# Patient Record
Sex: FEMALE | Race: WHITE | NOT HISPANIC OR LATINO | Employment: FULL TIME | URBAN - METROPOLITAN AREA
[De-identification: names, ages, dates, MRNs, and addresses within clinical notes are randomized per-mention and may not be internally consistent; named-entity substitution may affect disease eponyms.]

---

## 2017-02-08 ENCOUNTER — LAB REQUISITION (OUTPATIENT)
Dept: LAB | Facility: HOSPITAL | Age: 47
End: 2017-02-08
Payer: COMMERCIAL

## 2017-02-08 ENCOUNTER — ALLSCRIPTS OFFICE VISIT (OUTPATIENT)
Dept: OTHER | Facility: OTHER | Age: 47
End: 2017-02-08

## 2017-02-08 DIAGNOSIS — Z01.419 ENCOUNTER FOR GYNECOLOGICAL EXAMINATION WITHOUT ABNORMAL FINDING: ICD-10-CM

## 2017-02-08 DIAGNOSIS — R92.2 INCONCLUSIVE MAMMOGRAM: ICD-10-CM

## 2017-02-08 DIAGNOSIS — N63.0 BREAST LUMP: ICD-10-CM

## 2017-02-08 DIAGNOSIS — Z12.31 ENCOUNTER FOR SCREENING MAMMOGRAM FOR MALIGNANT NEOPLASM OF BREAST: ICD-10-CM

## 2017-02-08 DIAGNOSIS — N93.9 ABNORMAL UTERINE AND VAGINAL BLEEDING, UNSPECIFIED: ICD-10-CM

## 2017-02-08 PROCEDURE — G0145 SCR C/V CYTO,THINLAYER,RESCR: HCPCS | Performed by: OBSTETRICS & GYNECOLOGY

## 2017-02-08 PROCEDURE — 87624 HPV HI-RISK TYP POOLED RSLT: CPT | Performed by: OBSTETRICS & GYNECOLOGY

## 2017-02-10 ENCOUNTER — HOSPITAL ENCOUNTER (OUTPATIENT)
Dept: MAMMOGRAPHY | Facility: MEDICAL CENTER | Age: 47
Discharge: HOME | End: 2017-02-10
Payer: COMMERCIAL

## 2017-02-10 ENCOUNTER — APPOINTMENT (OUTPATIENT)
Dept: LAB | Facility: MEDICAL CENTER | Age: 47
End: 2017-02-10
Payer: COMMERCIAL

## 2017-02-10 ENCOUNTER — HOSPITAL ENCOUNTER (OUTPATIENT)
Dept: ULTRASOUND IMAGING | Facility: CLINIC | Age: 47
Discharge: HOME | End: 2017-02-10
Payer: COMMERCIAL

## 2017-02-10 ENCOUNTER — TRANSCRIBE ORDERS (OUTPATIENT)
Dept: ADMINISTRATIVE | Facility: HOSPITAL | Age: 47
End: 2017-02-10

## 2017-02-10 DIAGNOSIS — R92.2 INCONCLUSIVE MAMMOGRAM: ICD-10-CM

## 2017-02-10 DIAGNOSIS — N63.0 LUMP OR MASS IN BREAST: ICD-10-CM

## 2017-02-10 DIAGNOSIS — Z12.31 ENCOUNTER FOR SCREENING MAMMOGRAM FOR MALIGNANT NEOPLASM OF BREAST: ICD-10-CM

## 2017-02-10 DIAGNOSIS — N63.0 BREAST LUMP: ICD-10-CM

## 2017-02-10 DIAGNOSIS — N93.9 ABNORMAL UTERINE AND VAGINAL BLEEDING, UNSPECIFIED: ICD-10-CM

## 2017-02-10 LAB — TSH SERPL DL<=0.05 MIU/L-ACNC: 1.91 UIU/ML (ref 0.36–3.74)

## 2017-02-10 PROCEDURE — 36415 COLL VENOUS BLD VENIPUNCTURE: CPT

## 2017-02-10 PROCEDURE — G0279 TOMOSYNTHESIS, MAMMO: HCPCS

## 2017-02-10 PROCEDURE — 84443 ASSAY THYROID STIM HORMONE: CPT

## 2017-02-10 PROCEDURE — 76642 ULTRASOUND BREAST LIMITED: CPT

## 2017-02-10 PROCEDURE — G0204 DX MAMMO INCL CAD BI: HCPCS

## 2017-02-13 ENCOUNTER — HOSPITAL ENCOUNTER (OUTPATIENT)
Dept: ULTRASOUND IMAGING | Facility: HOSPITAL | Age: 47
Discharge: HOME | End: 2017-02-13
Attending: OBSTETRICS & GYNECOLOGY
Payer: COMMERCIAL

## 2017-02-13 DIAGNOSIS — N93.9 ABNORMAL UTERINE AND VAGINAL BLEEDING, UNSPECIFIED: ICD-10-CM

## 2017-02-13 PROCEDURE — 76830 TRANSVAGINAL US NON-OB: CPT

## 2017-02-13 PROCEDURE — 76856 US EXAM PELVIC COMPLETE: CPT

## 2017-02-14 LAB — HPV RRNA GENITAL QL NAA+PROBE: NORMAL

## 2017-02-15 ENCOUNTER — GENERIC CONVERSION - ENCOUNTER (OUTPATIENT)
Dept: OTHER | Facility: OTHER | Age: 47
End: 2017-02-15

## 2017-02-15 LAB
LAB AP GYN PRIMARY INTERPRETATION: NORMAL
Lab: NORMAL

## 2017-07-12 DIAGNOSIS — R92.8 OTHER ABNORMAL AND INCONCLUSIVE FINDINGS ON DIAGNOSTIC IMAGING OF BREAST: ICD-10-CM

## 2017-07-24 ENCOUNTER — ALLSCRIPTS OFFICE VISIT (OUTPATIENT)
Dept: OTHER | Facility: OTHER | Age: 47
End: 2017-07-24

## 2017-08-11 ENCOUNTER — HOSPITAL ENCOUNTER (OUTPATIENT)
Dept: ULTRASOUND IMAGING | Facility: CLINIC | Age: 47
Discharge: HOME | End: 2017-08-11
Payer: COMMERCIAL

## 2017-08-11 DIAGNOSIS — R92.8 OTHER ABNORMAL AND INCONCLUSIVE FINDINGS ON DIAGNOSTIC IMAGING OF BREAST: ICD-10-CM

## 2017-08-11 PROCEDURE — 76642 ULTRASOUND BREAST LIMITED: CPT

## 2017-08-15 DIAGNOSIS — R92.8 OTHER ABNORMAL AND INCONCLUSIVE FINDINGS ON DIAGNOSTIC IMAGING OF BREAST: ICD-10-CM

## 2017-08-21 ENCOUNTER — GENERIC CONVERSION - ENCOUNTER (OUTPATIENT)
Dept: OTHER | Facility: OTHER | Age: 47
End: 2017-08-21

## 2017-09-15 ENCOUNTER — GENERIC CONVERSION - ENCOUNTER (OUTPATIENT)
Dept: OTHER | Facility: OTHER | Age: 47
End: 2017-09-15

## 2018-01-11 NOTE — RESULT NOTES
Verified Results  * US PELVIS COMPLETE [facility] [place] AND TRANSVAGINAL) [id number] 02:25CN Reji Danielle Order Number: [id number]    - Patient Instructions: To schedule this appointment, please contact Central Scheduling at [phone]  Test Name Result Flag Reference   US PELVIS COMPLETE (TRANSABDOMINAL AND TRANSVAGINAL) (Report)     PELVIC ULTRASOUND, COMPLETE     INDICATION: 14-year-old premenopausal woman presenting with irregular periods  History of uterine fibroids  LMP was 2017      COMPARISON: None available at time of image interpretation  TECHNIQUE:  Transabdominal pelvic ultrasound was performed in sagittal and transverse planes with a curvilinear transducer  Additional transvaginal imaging was performed to better evaluate the endometrium and ovaries  Imaging included volumetric    sweeps as well as traditional still imaging technique  FINDINGS:     UTERUS:   The uterus is anteverted in position, measuring 10 4 x 4 3 x 6 5 cm  Contour and echotexture appear normal     Multiple uterine leiomyomas are noted includin  Right lower uterine segment subserosal fibroid measuring 4 9 x 3 9 x 4 6 cm    2  Left fundal subserosal fibroid measuring 3 8 x 3 4 x 3 9 cm   The cervix shows no suspicious abnormality  ENDOMETRIUM:    Normal caliber of 15 mm  Homogenous and normal in appearance for a premenopausal female  OVARIES/ADNEXA:   Right ovary: 2 7 x 1 5 x 1 4 cm  No suspicious right ovarian abnormality  Doppler flow within normal limits  Left ovary: Suboptimal evaluated as it is only visualized transabdominally  2 9 x 1 9 x 2 4 cm  No suspicious left ovarian abnormality  Doppler flow within normal limits  No suspicious adnexal mass or loculated collections  There is no free fluid  IMPRESSION:     1  Uterine fibroids as above  2  Suboptimal visualization of the left ovary, as it is only seen transabdominally            Workstation performed: [id number]     Signed by:   Elizabeth Glez MD   2/14/17     *US BREAST RIGHT LIMITED (DIAGNOSTIC) [id number] 61:72OP Camillia Begun     Test Name Result Flag Reference   US BREAST RIGHT LIMITED (Report)     Patient History:   Family history of breast cancer at age 48 in mother  No Hormone Replacement Therapy   Patient has never smoked  Patient's BMI is 2 4  Reason for exam: high-risk patient  Mammo Diagnostic Bilateral W DBT and CAD: February 10, 2017 -    Check In #: [de-identified]   2D/3D Procedure   3D views: Bilateral MLO view(s) were taken  2D views: Bilateral CC and XCCL view(s) were taken  Radiologist: GATO Oropeza  at [address] [place] [address]   Technologist: ABDIEL Ramirez (ABDIEL)(M)   Prior study comparison: June 6, 2016, bilateral screening whole    breast ultrasound, performed at [address]  May 25, 2016, mammo diagnostic left W CAD, performed at    [address]  May 25, 2016, US breast left    limited, performed at [address]  January 20, 2016, mammo screening bilateral W CAD, performed at [place] [address]  March 6, 2012, bilateral    screening mammogram, performed at [facility]  February 17, 2011, bilateral screening mammogram, performed at    [facility]  December 29, 2009, bilateral mammogram,    performed at [facility]  The breast tissue is heterogeneously dense, potentially limiting    the sensitivity of mammography  Patient risk, included in this    report, assists in determining the appropriate screening regimen    (such as 3-D mammography or the inclusion of automated breast    ultrasound or MRI)  3-D mammography may also remain indicated as    screening  US Breast Right Limited: February 10, 2017 - Check In #: [de-identified]   Standard views       Radiologist: GATO Oropeza  at this facility Technologist: Vazquez Flattery   Heterogeneity can be either focal or diffuse  The breast    echotexture is characterized by multiple small areas of increased   and decreased echogenicity  Shadowing may occur at the    interfaces of the fat lobules and parenchyma  This pattern occurs   in younger breasts and those with heterogeneously dense    parenchyma depicted mammographically  Prior to imaging, a    radiopaque marker was applied to the skin of the upper outer    periareolar right breast in the area of palpable abnormality  In the area of palpable abnormality, a 1 6 cm nodule is    identified at approximately the 10 o'clock position  No other nodules are seen, although nodules could be obscured    within the heterogenously dense breast tissue  There is no    evidence of suspicious microcalcifications  There is also no    evidence of architectural distortion or skin thickening  The    axillary regions are benign  Following diagnostic mammography, the patient was taken to the    ultrasound suite  RIGHT BREAST ULTRASOUND:     Grayscale sonography of the upper outer periareolar right breast    reveals a cluster of cysts, the largest measuring 1 2 x 0 6 x 0 9   cm  These correspond to the nodular density seen on    mammography  At the 10 o'clock position, 5 cm from the nipple, located just    superficial to the pectoralis muscle, there is a hypoechoic    nodule which measures 0 9 x 0 6 x 0 8 cm  The differential    diagnosis includes complicated cyst versus solid nodule such as    fibroadenoma  Repeat right breast ultrasound in 6 months is    recommended  A 3 mm cyst is seen at the 10 o'clock position, 6 cm the nipple  1  Upper outer right breast nodule, located in the area of    palpable abnormality, which corresponds to a cluster of cysts on    ultrasound  No further follow-up is necessary     2  There is a 9 mm hypoechoic nodule at the 10 o'clock position    of the right breast, seen on ultrasound, for which six-month    follow-up ultrasound is recommended  3  Unremarkable left mammogram      ACR BI-RADS® Assessments: BiRad:3 - Probably Benign (Overall)   Diag Mammo: BiRad:2 - benign finding in both breasts  Right breast Right Brst US: BiRad:3 - probably benign finding in    the right breast      Recommendation:   Clinical management  Ultrasound of the right breast in 6 months  Transcription Location: [facility] 98: [id number]     Risk Value(s):   Tyrer-Cuzick 10 Year: 3 700%, Tyrer-Cuzick Lifetime: 17 700%,    Myriad Table: 1 5%, SEVERIANO 5 Year: 1 5%, NCI Lifetime: 15 7%   Signed by:   Marivel Glasgow MD   2/10/17     (1) TSH WITH FT4 REFLEX [id number] 80:48ZU Liset Colvin Order Number: [id number]     Test Name Result Flag Reference   TSH 1 910 uIU/mL  0 358-3 740   Patients undergoing fluorescein dye angiography may retain small amounts of fluorescein in the body for 48-72 hours post procedure  Samples containing fluorescein can produce falsely depressed TSH values  If the patient had this procedure,a specimen should be resubmitted post fluorescein clearance  The recommended reference ranges for TSH during pregnancy are as follows:  First trimester 0 1 to 2 5 uIU/mL  Second trimester  0 2 to 3 0 uIU/mL  Third trimester 0 3 to 3 0 uIU/m     [facility] DIAGNOSTIC BILATERAL W 3D & CAD [id number] 16:63UX Liset Colvin Order Number: [id number]    - Patient Instructions: To schedule this appointment, please contact Central Scheduling at [phone]  Do not wear any perfume, powder, lotion or deodorant on breast or underarm area  Please bring your doctors order, referral (if needed) and insurance information with you on the day of the test  Failure to bring this information may result in this test being rescheduled  Arrive 15 minutes prior to your appointment time to register      On the day of your test, please bring any prior mammogram or breast studies with you that were not performed at a [facility]  Failure to bring prior exams may result in your test needing to be rescheduled  Test Name Result Flag Reference   MAMMO DIAGNOSTIC BILATERAL W 3D & CAD (Report)     Patient History:   Family history of breast cancer at age 48 in mother  No Hormone Replacement Therapy   Patient has never smoked  Patient's BMI is 2 4  Reason for exam: high-risk patient  Mammo Diagnostic Bilateral W DBT and CAD: February 10, 2017 -    Check In #: [de-identified]   2D/3D Procedure   3D views: Bilateral MLO view(s) were taken  2D views: Bilateral CC and XCCL view(s) were taken  Radiologist: GATO Hudson  at [place] [address]   Technologist: ABDIEL Chong (R)(M)   Prior study comparison: June 6, 2016, bilateral screening whole    breast ultrasound, performed at [address] [place] [address]  May 25, 2016, mammo diagnostic left W CAD, performed at    [address] [place] [address]  May 25, 2016, US breast left    limited, performed at [address] [place] [address]  January 20, 2016, mammo screening bilateral W CAD, performed at [facility]  March 6, 2012, bilateral    screening mammogram, performed at [facility]  February 17, 2011, bilateral screening mammogram, performed at    [facility]  December 29, 2009, bilateral mammogram,    performed at [facility]  The breast tissue is heterogeneously dense, potentially limiting    the sensitivity of mammography  Patient risk, included in this    report, assists in determining the appropriate screening regimen    (such as 3-D mammography or the inclusion of automated breast    ultrasound or MRI)  3-D mammography may also remain indicated as    screening  US Breast Right Limited: February 10, 2017 - Check In #: [de-identified]   Standard views       Radiologist: GATO Hudson  at [facility] facility   Technologist: Marycruz Andrews   Heterogeneity can be either focal or diffuse  The breast    echotexture is characterized by multiple small areas of increased   and decreased echogenicity  Shadowing may occur at the    interfaces of the fat lobules and parenchyma  This pattern occurs   in younger breasts and those with heterogeneously dense    parenchyma depicted mammographically  Prior to imaging, a    radiopaque marker was applied to the skin of the upper outer    periareolar right breast in the area of palpable abnormality  In the area of palpable abnormality, a 1 6 cm nodule is    identified at approximately the 10 o'clock position  No other nodules are seen, although nodules could be obscured    within the heterogenously dense breast tissue  There is no    evidence of suspicious microcalcifications  There is also no    evidence of architectural distortion or skin thickening  The    axillary regions are benign  Following diagnostic mammography, the patient was taken to the    ultrasound suite  RIGHT BREAST ULTRASOUND:     Grayscale sonography of the upper outer periareolar right breast    reveals a cluster of cysts, the largest measuring 1 2 x 0 6 x 0 9   cm  These correspond to the nodular density seen on    mammography  At the 10 o'clock position, 5 cm from the nipple, located just    superficial to the pectoralis muscle, there is a hypoechoic    nodule which measures 0 9 x 0 6 x 0 8 cm  The differential    diagnosis includes complicated cyst versus solid nodule such as    fibroadenoma  Repeat right breast ultrasound in 6 months is    recommended  A 3 mm cyst is seen at the 10 o'clock position, 6 cm the nipple  1  Upper outer right breast nodule, located in the area of    palpable abnormality, which corresponds to a cluster of cysts on    ultrasound  No further follow-up is necessary     2  There is a 9 mm hypoechoic nodule at the 10 o'clock position    of the right breast, seen on ultrasound, for which six-month    follow-up ultrasound is recommended  3  Unremarkable left mammogram      ACR BI-RADS® Assessments: BiRad:3 - Probably Benign (Overall)   Diag Mammo: BiRad:2 - benign finding in both breasts  Right breast Right Brst US: BiRad:3 - probably benign finding in    the right breast      Recommendation:   Clinical management  Ultrasound of the right breast in 6 months  Transcription Location: [facility] 98: [id number]     Risk Value(s):   Tyrer-Cuzick 10 Year: 3 700%, Tyrer-Cuzick Lifetime: 17 700%,    Myriad Table: 1 5%, SEVERIANO 5 Year: 1 5%, NCI Lifetime: 15 7%   Signed by:   Rogerio Simpson MD   2/10/17     (1) THIN PREP PAP WITH IMAGING [id number] 92:60LQ Ancel Primrose Order Number: [id number]     Test Name Result Flag Reference   LAB AP CASE REPORT (Report)     Gynecologic Cytology Report            Case: [id number]                  Authorizing Provider: Cody Morrissey DO     Collected:      02/08/2017 5562        First Screen:     VICKI Triana Received:      02/10/2017 0944        Specimen:  LIQUID-BASED PAP, SCREENING, Endocervical   HPV HIGH RISK RESULT (Report)     HPV, High Risk: HPV NEG, HPV16 NEG, HPV18 NEG      Other High Risk HPV Negative, HPV 16 Negative, HPV 18 Negative  HPV types: 16,18,31,33,35,39,45,51,52,56,58,59,66 and 68 DNA are undetectable or below the pre-set threshold  [employer]?s FDA approved Madeline 4800 is utilized with strict adherence to the ?s instruction  manual to test for the presence of High-Risk HPV DNA, as well as HPV 16 and HPV 18  This instrument  has been validated by our laboratory and/or by the   A negative result does not preclude the presence of HPV infection because results depend on adequate  specimen collection, absence of inhibitors and sufficient DNA to be detected   Additionally, HPV negative  results are not intended to prevent women from proceeding to colposcopy if clinically warranted  Positive HPV test results indicate the presence of any one or more of the high risk types, but since patients  are often co-infected with low-risk types it does not rule out the presence of low-risk types in patients  with mixed infections  LAB AP GYN PRIMARY INTERPRETATION      Negative for intraepithelial lesion or malignancy  Electronically signed by VICKI Gore on 2/15/2017 at 1:19 PM   LAB AP GYN SPECIMEN ADEQUACY      Satisfactory for evaluation  Endocervical/transformation zone component present  LAB AP GYN ADDITIONAL INFORMATION (Report)     [employer]'s FDA approved ,  and ThinPrep Imaging System are   utilized with strict adherence to the 's instruction manual to   prepare gynecologic and non-gynecologic cytology specimens for the   production of ThinPrep slides as well as for gynecologic ThinPrep imaging  These processes have been validated by our laboratory and/or by the     The Pap test is not a diagnostic procedure and should not be used as the   sole means to detect cervical cancer  It is only a screening procedure to   aid in the detection of cervical cancer and its precursors  Both   false-negative and false-positive results have been experienced  Your   patient's test result should be interpreted in this context together with   the history and clinical findings

## 2018-01-12 VITALS
DIASTOLIC BLOOD PRESSURE: 72 MMHG | SYSTOLIC BLOOD PRESSURE: 110 MMHG | WEIGHT: 132 LBS | HEART RATE: 69 BPM | BODY MASS INDEX: 24.29 KG/M2 | HEIGHT: 62 IN

## 2018-01-13 NOTE — RESULT NOTES
Verified Results  *US BREAST RIGHT LIMITED (DIAGNOSTIC) [id number] 48:88ZN Reji Danielle Order Number: [id number]    - Patient Instructions: To schedule this appointment, please contact Central Scheduling at [phone]  Test Name Result Flag Reference   US BREAST RIGHT LIMITED (Report)     Patient History:   Family history of breast cancer at age 48 in mother  No Hormone Replacement Therapy   Patient has never smoked  Patient's BMI is 2 4  Reason for exam: follow-up at short interval from prior study  Six-month follow-up exam     US Breast Right Limited: August 11, 2017 - Check In #: [de-identified]   Standard views  Technologist: Du Upton RDMS   Prior study comparison: February 10, 2017, mammo diagnostic    bilateral W DBT and CAD, performed at [place] [address]  February 10, 2017, US breast right limited    performed at [facility]  June 6, 2016,    bilateral screening whole breast ultrasound performed at [facility]  May 25, 2016, mammo diagnostic    left W CAD performed at [facility]  May    25, 2016, US breast left limited performed at [facility]  January 20, 2016, mammo screening bilateral W    CAD, performed at [place] [address]  Targeted sonographic evaluation of the right breast 10 o'clock    position 5 cm from the nipple reveals a 6 x 8 x 5 mm hypoechoic    lesion with a few internal echoes  This likely represents a    complicated cyst and was without significant change in size in    the interval given differences in technique  Incidental note of    2 adjacent simple cysts on the cine images similar in appearance    to prior exam       Probable complicated cyst at the 10 o'clock position   5 cm from the nipple  Recommend 6 month ultrasound follow-up to   ensure stability at time of screening mammography       ACR BI-RADSï¾® Assessments: BiRad:3 - Probably Benign     Recommendation:   Ultrasound of the right breast in 6 months  The patient is scheduled in a reminder system  Transcription Location: [facility] 98: [id number]     Risk Value(s):   Tyrer-Cuzick 10 Year: 3 700%, Tyrer-Cuzick Lifetime: 17 700%,    Myriad Table: 1 5%, SEVERIANO 5 Year: 1 5%, NCI Lifetime: 15 7%   Signed by:   Wayne Krishna MD   8/11/17       Plan  Abnormal mammogram    · *US BREAST LEFT LIMITED (DIAGNOSTIC); Status:Canceled;    · *US BREAST RIGHT LIMITED (DIAGNOSTIC);  Status:Hold For - Scheduling; Requested  for:[id number];

## 2018-01-15 NOTE — MISCELLANEOUS
Message   Recorded as Task   Date: 09/08/2017 12:46 PM, Created By: Nathen Jenkins   Task Name: Care Coordination   Assigned To: Miguel Kinsey   Regarding Patient: Carlo Roper, Status: Active   Amparo Pruitt - 08 Sep 2017 12:46 PM     TASK CREATED  Caller: Self; Care Coordination; [phone] (Home)  CX surgery for 10/2017 due to family problem  Will call back when she can reschedule  Active Problems    1  Abnormal mammogram (793 80) (R92 8)   2  Abnormal uterine bleeding (AUB) (626 9) (N93 9)   3  Acute maxillary sinusitis, recurrence not specified (461 0) (J01 00)   4  Constipation (564 00) (K59 00)   5  Dense breasts (793 82) (R92 2)   6  Dysmenorrhea (625 3) (N94 6)   7  Encounter for screening mammogram for malignant neoplasm of breast (V76 12)   (Z12 31)   8  Generalized abdominal pressure (789 07) (R10 84)   9  GERD (gastroesophageal reflux disease) (530 81) (K21 9)   10  IBS (irritable bowel syndrome) (564 1) (K58 9)   11  Mass of right breast (611 72) (N63)   12  RUQ abdominal pain (789 01) (R10 11)   13  Ulcer (707 9) (L98 499)   14  Uterine leiomyoma, unspecified location (218 9) (D25 9)   15  Well female exam with routine gynecological exam (V72 31) (Z01 419)    Current Meds   1  Aspirin 81 MG TABS; Therapy: (Recorded:84Tdk0655) to Recorded   2  Biotin 1 MG Oral Capsule; Therapy: (Recorded:60Chg8015) to Recorded   3  Calcium 500 TABS; Therapy: (Recorded:18Agg7718) to Recorded   4  Stool Softener TABS; Therapy: (Recorded:07Zis2146) to Recorded   5  Tranexamic Acid 650 MG Oral Tablet; TAKE 2 TABLET 3 times daily; Therapy: 45LOL4555 to (Evaluate:09Apr2017); Last Rx:71Ixn7134 Ordered   6  Vitamin D3 TABS; Therapy: (0345 74 47 21) to Recorded   7  Vitamin E TABS; Therapy: (Recorded:12Hff9718) to Recorded    Allergies    1  No Known Drug Allergies    2  No Known Environmental Allergies   3   No Known Food Allergies    Signatures   Electronically signed by : Shanna Kimbrough, DO; Sep 15 5410 11:10PM EST                       (Author)

## 2018-02-01 DIAGNOSIS — Z12.31 ENCOUNTER FOR SCREENING MAMMOGRAM FOR MALIGNANT NEOPLASM OF BREAST: ICD-10-CM

## 2018-02-01 DIAGNOSIS — R92.8 OTHER ABNORMAL AND INCONCLUSIVE FINDINGS ON DIAGNOSTIC IMAGING OF BREAST: ICD-10-CM

## 2018-02-05 DIAGNOSIS — R92.8 OTHER ABNORMAL AND INCONCLUSIVE FINDINGS ON DIAGNOSTIC IMAGING OF BREAST: ICD-10-CM

## 2018-04-02 ENCOUNTER — ANNUAL EXAM (OUTPATIENT)
Dept: OBGYN CLINIC | Facility: CLINIC | Age: 48
End: 2018-04-02
Payer: COMMERCIAL

## 2018-04-02 VITALS
DIASTOLIC BLOOD PRESSURE: 74 MMHG | WEIGHT: 138 LBS | SYSTOLIC BLOOD PRESSURE: 118 MMHG | HEIGHT: 62 IN | BODY MASS INDEX: 25.4 KG/M2

## 2018-04-02 DIAGNOSIS — Z01.419 WELL FEMALE EXAM WITH ROUTINE GYNECOLOGICAL EXAM: Primary | ICD-10-CM

## 2018-04-02 DIAGNOSIS — R92.8 ABNORMAL MAMMOGRAM: ICD-10-CM

## 2018-04-02 DIAGNOSIS — Z12.31 ENCOUNTER FOR SCREENING MAMMOGRAM FOR MALIGNANT NEOPLASM OF BREAST: ICD-10-CM

## 2018-04-02 PROBLEM — R92.2 DENSE BREASTS: Status: ACTIVE | Noted: 2017-02-08

## 2018-04-02 PROBLEM — K58.9 IBS (IRRITABLE BOWEL SYNDROME): Status: ACTIVE | Noted: 2017-02-08

## 2018-04-02 PROBLEM — IMO0002 ULCER: Status: ACTIVE | Noted: 2017-02-08

## 2018-04-02 PROBLEM — R92.30 DENSE BREASTS: Status: ACTIVE | Noted: 2017-02-08

## 2018-04-02 PROBLEM — D25.9 UTERINE LEIOMYOMA: Status: ACTIVE | Noted: 2017-02-08

## 2018-04-02 PROCEDURE — S0612 ANNUAL GYNECOLOGICAL EXAMINA: HCPCS | Performed by: OBSTETRICS & GYNECOLOGY

## 2018-04-02 RX ORDER — CALCIUM CARBONATE 500(1250)
TABLET ORAL
COMMUNITY
End: 2020-06-30 | Stop reason: ALTCHOICE

## 2018-04-02 RX ORDER — DIPHENOXYLATE HYDROCHLORIDE AND ATROPINE SULFATE 2.5; .025 MG/1; MG/1
1 TABLET ORAL DAILY
COMMUNITY

## 2018-04-02 RX ORDER — NICOTINE POLACRILEX 2 MG
GUM BUCCAL
COMMUNITY
End: 2021-12-07 | Stop reason: ALTCHOICE

## 2018-04-02 RX ORDER — CYANOCOBALAMIN (VITAMIN B-12) 500 MCG
LOZENGE ORAL
COMMUNITY
End: 2020-06-30 | Stop reason: ALTCHOICE

## 2018-04-02 RX ORDER — ASPIRIN 81 MG
TABLET, DELAYED RELEASE (ENTERIC COATED) ORAL
COMMUNITY
End: 2019-08-26

## 2018-04-02 NOTE — PROGRESS NOTES
ASSESSMENT & PLAN: Manjeet Manning is a 49 y o  [id number] with normal gynecologic exam     1   Routine well woman exam done today  2    Pap and HPV:Pap with HPV was not done today  Current ASCCP Guidelines reviewed  Last Pap  2017 :  no abnormalities  3  Mammogram ordered  Recommend yearly mammography  4   The patient declined STD testing  No testing performed  Safe sex practices have been discussed  5  The patient is sexually active  She declined contraception  Partner has vasectomy  6  The following were reviewed in today's visit: breast self exam, mammography screening ordered, adequate intake of calcium and vitamin D, exercise and healthy diet  7  Patient to return to office in 12 months for annual exam    8  PCP referrals given to patient  All questions have been answered to her satisfaction  CC:  Annual Gynecologic Examination    HPI: Manjeet Manning is a 49 y o  [id number] who presents for annual gynecologic examination  She has the following concerns: none  Not as much fibroid pain, less often and less painful  Periods are heavy in beginning but then ok, heavy for 3 days  She tried tranexamic acid in the past but didn't feel great on the medication  Discussed diet and exercise  Needs a new PCP, patient given referrals  Has occasional episode of a catch in her breath starting in her mid upper abd  Had EKG in past  Encouraged to make PCP appt to discuss, consider cards consult? Denies palpitations, chest pain   noted it happening once in her sleep  Health Maintenance:    She exercises 3 days per week  She wears her seatbelt routinely  She does perform regular monthly self breast exams  She feels safe at home  Patients does follow a balanced diet        Past Medical History:   Diagnosis Date    Abnormal Pap smear of cervix     ascus    Anxiety     Disease of thyroid gland     Migraine     in the past       Past Surgical History:   Procedure Laterality Date    APPENDECTOMY      CERVICAL POLYPECTOMY         Past OB/Gyn History:  Period Cycle (Days): 28  Period Duration (Days): 7 days   Period Pattern: Regular  Menstrual Flow: Moderate, Heavy  Menstrual Control: Tampon, Panty liner  Dysmenorrhea: (!) Moderate  Dysmenorrhea Symptoms: Cramping, HeadachePatient's last menstrual period was 2018  Menstrual History:  OB History      Para Term  AB Living    2 2 2     2    SAB TAB Ectopic Multiple Live Births            2           Patient's last menstrual period was 2018  Period Cycle (Days): 28  Period Duration (Days): 7 days   Period Pattern: Regular  Menstrual Flow: Moderate, Heavy  Menstrual Control: Tampon, Panty liner  Dysmenorrhea: (!) Moderate  Dysmenorrhea Symptoms: Cramping, Headache    History of sexually transmitted infection No  Patient is currently sexually active - Heterosexual   Birth control: vasectomy  Last Pap:  2017 :  no abnormalities  Family History   Problem Relation Age of Onset   [facility] Breast cancer Mother     Heart disease Mother     Thyroid disease Mother     Heart disease Father     Thyroid disease Maternal Grandmother     Diabetes Maternal Grandmother     Melanoma Maternal Grandfather        Social History:  Social History     Social History    Marital status: /Civil Union     Spouse name: N/A    Number of children: N/A    Years of education: N/A     Occupational History    Not on file  Social History Main Topics    Smoking status: Never Smoker    Smokeless tobacco: Never Used    Alcohol use No    Drug use: No    Sexual activity: Yes     Partners: Male     Birth control/ protection: None, Male Sterilization     Other Topics Concern    Not on file     Social History Narrative    No narrative on file     Presently lives with   Patient is   Patient is currently employed      No Known Allergies    Current Outpatient Prescriptions:     aspirin 81 MG tablet, Take by mouth, Disp: , Rfl:     Biotin 1 MG CAPS, Take by mouth, Disp: , Rfl:     Calcium 500 MG tablet, Take by mouth, Disp: , Rfl:     Docusate Sodium 100 MG capsule, Take by mouth, Disp: , Rfl:     multivitamin (THERAGRAN) TABS, Take 1 tablet by mouth daily, Disp: , Rfl:     Vitamin E 400 units TABS, Take by mouth, Disp: , Rfl:     Review of Systems:  A complete review of systems was performed and was negative, except as listed  Denies weight changes, excessive fatigue, urinary incontinence, urinary frequency, constipation or bowel changes, blood in stool, severe headaches, vaginal bleeding, vaginal discharge  Physical Exam:  /74   Ht 5' 2" (1 575 m)   Wt 62 6 kg (138 lb)   LMP 03/27/2018   Breastfeeding? No   BMI 25 24 kg/m²    GEN: The patient was alert and oriented x3, pleasant well-appearing female in no acute distress  HEENT:  Unremarkable, no anterior or posterior lymphadenopathy, no thyromegaly  CV:  RRR, no murmurs  RESP:  Clear to auscultation bilaterally  BREAST:  Symmetric breasts with no palpable breast masses or obvious breast lesions  She has no retractions or nipple discharge  She has no axillary abnormalities or palpable masses  Self breast exam is taught  ABD:  Soft, nontender, non-distended  EXT: nontender, no edema  PELVIC:  Normal appearing external female genitalia, normal vaginal epithelium, No discharge  Cervix present   Bimanual: absent CMT,  normal uterus, non-tender  No palpable adnexal masses  Pap was not collected

## 2018-07-03 ENCOUNTER — HOSPITAL ENCOUNTER (OUTPATIENT)
Dept: MAMMOGRAPHY | Facility: MEDICAL CENTER | Age: 48
Discharge: HOME | End: 2018-07-03
Payer: COMMERCIAL

## 2018-07-03 DIAGNOSIS — Z12.31 ENCOUNTER FOR SCREENING MAMMOGRAM FOR MALIGNANT NEOPLASM OF BREAST: ICD-10-CM

## 2018-07-03 PROCEDURE — 77063 BREAST TOMOSYNTHESIS BI: CPT

## 2018-07-03 PROCEDURE — 77067 SCR MAMMO BI INCL CAD: CPT

## 2018-07-09 ENCOUNTER — HOSPITAL ENCOUNTER (OUTPATIENT)
Dept: ULTRASOUND IMAGING | Facility: CLINIC | Age: 48
Discharge: HOME | End: 2018-07-09
Payer: COMMERCIAL

## 2018-07-09 ENCOUNTER — HOSPITAL ENCOUNTER (OUTPATIENT)
Dept: MAMMOGRAPHY | Facility: CLINIC | Age: 48
Discharge: HOME | End: 2018-07-09
Payer: COMMERCIAL

## 2018-07-09 DIAGNOSIS — R92.8 OTHER ABNORMAL AND INCONCLUSIVE FINDINGS ON DIAGNOSTIC IMAGING OF BREAST: ICD-10-CM

## 2018-07-09 DIAGNOSIS — Z12.31 ENCOUNTER FOR SCREENING MAMMOGRAM FOR MALIGNANT NEOPLASM OF BREAST: ICD-10-CM

## 2018-07-09 PROCEDURE — 76642 ULTRASOUND BREAST LIMITED: CPT

## 2019-08-26 ENCOUNTER — ANNUAL EXAM (OUTPATIENT)
Dept: OBGYN CLINIC | Facility: CLINIC | Age: 49
End: 2019-08-26
Payer: COMMERCIAL

## 2019-08-26 VITALS
DIASTOLIC BLOOD PRESSURE: 74 MMHG | BODY MASS INDEX: 24.29 KG/M2 | HEIGHT: 62 IN | WEIGHT: 132 LBS | SYSTOLIC BLOOD PRESSURE: 122 MMHG

## 2019-08-26 DIAGNOSIS — Z12.31 ENCOUNTER FOR SCREENING MAMMOGRAM FOR MALIGNANT NEOPLASM OF BREAST: ICD-10-CM

## 2019-08-26 DIAGNOSIS — R92.2 DENSE BREASTS: ICD-10-CM

## 2019-08-26 DIAGNOSIS — Z01.419 ENCOUNTER FOR WELL WOMAN EXAM WITH ROUTINE GYNECOLOGICAL EXAM: Primary | ICD-10-CM

## 2019-08-26 PROCEDURE — S0612 ANNUAL GYNECOLOGICAL EXAMINA: HCPCS | Performed by: OBSTETRICS & GYNECOLOGY

## 2019-08-26 NOTE — PROGRESS NOTES
ASSESSMENT & PLAN: Malon Claude is a 51 y o  [id number] with normal gynecologic exam     1   Routine well woman exam done today  2    Pap and HPV:Pap with HPV was not done today  Current ASCCP Guidelines reviewed  Last Pap 2/8/17:  no abnormalities, HPV negative  3  Mammogram ordered  Recommend yearly mammography  Due to dense breasts, 3D screening mammogram ordered  4   Colonoscopy recommended  Next year will start screening at 53yo  5  The patient is sexually active  6  The following were reviewed in today's visit: breast self exam, mammography screening ordered, STD testing, menopause, exercise, healthy diet and colonoscopy discussed with recommendations for colonoscopy screening starting next year  7  Patient to return to office in 12 months for annual exam      All questions have been answered to her satisfaction  CC:  Annual Gynecologic Examination    HPI: Malon Claude is a 51 y o  [id number] who presents for annual gynecologic examination  She has the following concerns:      Patient reports no major concerns  She is still having regular monthly menses  Menses are heavy but not affecting her quality of life  Reports hx of ovarian cysts that caused intermittent pain, that has resolved and not recurred for over a year  She reports dense and tender breasts  Discussed screening via 3D mammogram  Discussed typical menopausal symptoms  Patient denies menopausal symptoms  Health Maintenance:    She exercises 4 days per week  She wears her seatbelt routinely  She does perform regular monthly self breast exams  She feels safe at home  Patients does follow a Healthy diet  Last mammogram: 7/3/18 -- BIRADS 1 Negative        Past Medical History:   Diagnosis Date    Abnormal Pap smear of cervix     ascus    Anxiety     Disease of thyroid gland     Migraine     in the past     Past Surgical History:   Procedure Laterality Date    APPENDECTOMY      CERVICAL POLYPECTOMY       Past OB/Gyn History:  Period Cycle (Days): 30  Period Duration (Days): 5-7  Period Pattern: Regular  Menstrual Flow: Heavy, Moderate  Menstrual Control: Maxi pad, Tampon, Panty liner  Dysmenorrhea: (!) Mild  Dysmenorrhea Symptoms: CrampingPatient's last menstrual period was 2019  Menstrual History:  OB History        2    Para   2    Term   2            AB        Living   2       SAB        TAB        Ectopic        Multiple        Live Births   2                Patient's last menstrual period was 2019  Period Cycle (Days): 30  Period Duration (Days): 5-7  Period Pattern: Regular  Menstrual Flow: Heavy, Moderate  Menstrual Control: Maxi pad, Tampon, Panty liner  Dysmenorrhea: (!) Mild  Dysmenorrhea Symptoms: Cramping    Menstrual history: Patient is not post menopausal    History of sexually transmitted infection No  Patient is currently sexually active  Heterosexual   Last Pap 17 :  no abnormalities, HPV negative       Family History   Problem Relation Age of Onset   [facility] Breast cancer Mother     Heart disease Mother     Thyroid disease Mother     Coronary artery disease Mother    [facility] Hypothyroidism Mother     Heart disease Father     Coronary artery disease Father     Thyroid disease Maternal Grandmother     Diabetes Maternal Grandmother     Melanoma Maternal Grandfather      Social History:  Social History     Socioeconomic History    Marital status: /Civil Union     Spouse name: Not on file    Number of children: Not on file    Years of education: Not on file    Highest education level: Not on file   Occupational History    Not on file   Social Needs    Financial resource strain: Not on file    Food insecurity:     Worry: Not on file     Inability: Not on file    Transportation needs:     Medical: Not on file     Non-medical: Not on file   Tobacco Use    Smoking status: Never Smoker    Smokeless tobacco: Never Used   Substance and Sexual Activity    Alcohol use: No    Drug use: No    Sexual activity: Yes     Partners: Male     Birth control/protection: Male Sterilization   Lifestyle    Physical activity:     Days per week: Not on file     Minutes per session: Not on file    Stress: Not on file   Relationships    Social connections:     Talks on phone: Not on file     Gets together: Not on file     Attends [employer] service: Not on file     Active member of club or organization: Not on file     Attends meetings of clubs or organizations: Not on file     Relationship status: Not on file    Intimate partner violence:     Fear of current or ex partner: Not on file     Emotionally abused: Not on file     Physically abused: Not on file     Forced sexual activity: Not on file   Other Topics Concern    Not on file   Social History Narrative    Exercises regularly     Presently lives with   Patient is   No Known Allergies    Current Outpatient Medications:     aspirin 81 MG tablet, Take by mouth, Disp: , Rfl:     Biotin 1 MG CAPS, Take by mouth, Disp: , Rfl:     Calcium 500 MG tablet, Take by mouth, Disp: , Rfl:     multivitamin (THERAGRAN) TABS, Take 1 tablet by mouth daily, Disp: , Rfl:     Vitamin E 400 units TABS, Take by mouth, Disp: , Rfl:     Review of Systems:  Review of Systems   Constitutional: Negative for chills, diaphoresis and fever  Respiratory: Negative for chest tightness, shortness of breath and wheezing  Cardiovascular: Negative for chest pain and palpitations  Gastrointestinal: Negative for abdominal distention, abdominal pain, constipation, diarrhea, nausea and vomiting  Endocrine: Negative for polyuria  Genitourinary: Positive for dyspareunia (only with deep penetration)  Negative for difficulty urinating, enuresis, frequency, menstrual problem, pelvic pain, vaginal bleeding, vaginal discharge and vaginal pain  Musculoskeletal: Negative  Negative for myalgias  Skin: Negative      Neurological: Negative for dizziness, syncope, weakness, light-headedness and headaches  Hematological: Negative  Psychiatric/Behavioral: Negative  Physical Exam:  /74   Ht 5' 2" (1 575 m)   Wt 59 9 kg (132 lb)   LMP 07/29/2019   BMI 24 14 kg/m²    Physical Exam   Constitutional: She is oriented to person, place, and time  She appears well-developed and well-nourished  Genitourinary: Vagina normal and uterus normal  There is no tenderness, lesion or injury on the right labia  There is no tenderness, lesion or injury on the left labia  Vagina exhibits rugosity  Vagina exhibits no lesion  No erythema, tenderness or bleeding in the vagina  No vaginal discharge found  Right adnexum does not display mass, does not display tenderness and does not display fullness  Left adnexum does not display mass, does not display tenderness and does not display fullness  Cervix is parous  Cervix exhibits pinkness  Cervix does not exhibit motion tenderness, lesion, discharge, friability or polyp  Uterus is mobile and anteverted  Uterus is not enlarged, tender or exhibiting a mass  HENT:   Head: Normocephalic and atraumatic  Neck: Normal range of motion  Cardiovascular: Normal rate and intact distal pulses  Pulmonary/Chest: Effort normal  No respiratory distress  Right breast exhibits no mass, no nipple discharge, no skin change and no tenderness  Left breast exhibits tenderness  Left breast exhibits no mass, no nipple discharge and no skin change  No breast swelling or tenderness  Breasts are asymmetrical    Abdominal: Soft  She exhibits no distension and no mass  There is no tenderness  There is no guarding  Musculoskeletal: Normal range of motion  She exhibits no edema  Neurological: She is alert and oriented to person, place, and time  Skin: Skin is warm and dry  No erythema  No pallor  Psychiatric: She has a normal mood and affect   Her behavior is normal  Judgment and thought content normal    Nursing note and vitals reviewed

## 2019-10-09 ENCOUNTER — HOSPITAL ENCOUNTER (OUTPATIENT)
Dept: MAMMOGRAPHY | Facility: CLINIC | Age: 49
Discharge: HOME | End: 2019-10-09
Payer: COMMERCIAL

## 2019-10-09 ENCOUNTER — HOSPITAL ENCOUNTER (OUTPATIENT)
Dept: ULTRASOUND IMAGING | Facility: CLINIC | Age: 49
Discharge: HOME | End: 2019-10-09
Payer: COMMERCIAL

## 2019-10-09 VITALS — WEIGHT: 132 LBS | HEIGHT: 62 IN | BODY MASS INDEX: 24.29 KG/M2

## 2019-10-09 DIAGNOSIS — N64.4 BREAST PAIN, LEFT: ICD-10-CM

## 2019-10-09 DIAGNOSIS — Z12.31 ENCOUNTER FOR SCREENING MAMMOGRAM FOR MALIGNANT NEOPLASM OF BREAST: ICD-10-CM

## 2019-10-09 DIAGNOSIS — R92.8 ABNORMAL ULTRASOUND OF BREAST: ICD-10-CM

## 2019-10-09 PROCEDURE — G0279 TOMOSYNTHESIS, MAMMO: HCPCS

## 2019-10-09 PROCEDURE — 77066 DX MAMMO INCL CAD BI: CPT

## 2019-10-09 PROCEDURE — 76642 ULTRASOUND BREAST LIMITED: CPT

## 2019-10-09 NOTE — PROGRESS NOTES
Met with patient and Dr Thelma Greene regarding recommendation for;      ___x__ RIGHT ______LEFT      __x__Cyst Aspiration w/possible Ultrasound guided  ______Stereotactic  Breast   biopsy  ___x__Verbalized understanding        Blood thinners:  __x___yes _____no  (ASA 81 mg)    Date stopped: ____n/a_______    Biopsy teaching sheet given:  ____x___yes ______no

## 2019-10-13 NOTE — RESULT ENCOUNTER NOTE
Right- BIRADS 4 suspicion  Breast cyst aspiration ordered and scheduled for 10/18/19  Left BIRADS 1 -- negative

## 2019-10-18 ENCOUNTER — HOSPITAL ENCOUNTER (OUTPATIENT)
Dept: ULTRASOUND IMAGING | Facility: CLINIC | Age: 49
Discharge: HOME | End: 2019-10-18
Payer: COMMERCIAL

## 2019-10-18 VITALS — DIASTOLIC BLOOD PRESSURE: 76 MMHG | SYSTOLIC BLOOD PRESSURE: 128 MMHG | HEART RATE: 74 BPM

## 2019-10-18 DIAGNOSIS — R92.8 ABNORMAL ULTRASOUND OF BREAST: ICD-10-CM

## 2019-10-18 PROCEDURE — 10160 PNXR ASPIR ABSC HMTMA BULLA: CPT

## 2019-10-18 PROCEDURE — 76942 ECHO GUIDE FOR BIOPSY: CPT

## 2019-10-18 RX ORDER — LIDOCAINE HYDROCHLORIDE 10 MG/ML
4 INJECTION, SOLUTION INFILTRATION; PERINEURAL ONCE
Status: COMPLETED | OUTPATIENT
Start: 2019-10-18 | End: 2019-10-18

## 2019-10-18 RX ADMIN — LIDOCAINE HYDROCHLORIDE 4 ML: 10 INJECTION, SOLUTION INFILTRATION; PERINEURAL at 11:28

## 2019-10-18 NOTE — DISCHARGE INSTR - OTHER ORDERS
POST LARGE CORE BREAST BIOPSY PATIENT INFORMATION      1  Place an ice pack inside your bra over the top of the dressing every hour for 20 minutes (20 minutes on, 60 minutes off)  Do this until bedtime  2  Do not shower or bathe until the following morning  3  You may bathe your breast carefully with the steri-strips in place  Be careful    Not to loosen them  The steri-strips will fall off in 3-5 days  4  You may have mild discomfort, and you may have some bruising where the   Needle entered the skin  This should clear within 5-7 days  5  If you need medicine for discomfort, take acetaminophen products such as   Tylenol  You may also take Advil or Motrin products  6  Do not participate in strenuous activities such as-tennis, aerobics, skiing,  Weight lifting, etc  for 24 hours  Refrain from swimming/soaking for 72 hours  7  Wearing a bra for sleeping may be more comfortable for the first 24-48 hours  8  Watch for continued bleeding, pain or fever over 101; please call with any questions or concerns  For procedures done at the [facility]  [facility] [address] [address] [address] call:  Armando Gómez RN at [phone]  Sergo Traore RN at [phone]                    *After 4 PM call the Interventional Radiology Department                    [phone] and ask to speak with the nurse on call  For procedures done at the [address] [place] [address] call:         Pat Armijo RN at   *After 4 PM call the Interventional Radiology Department   [phone] and ask to speak with the nurse on call  For procedures done at [address] [place] [address] call: The Radiology Nurse at [phone]  *After 4 PM call your physician, or go to the Emergency Department  9          The final results of your biopsy are usually available within one week

## 2019-10-18 NOTE — PROGRESS NOTES
Procedure type:    __x___ultrasound guided _____stereotactic  Cyst aspiration    Breast:    _____Left __x___Right    Location: right breast 1000 5cm fn     Needle: 20g Bard    # of passes: 1 pass of 0 5cc clear fluid    Clip: none    Performed by: Dr Dania Verma held for 5 minutes by: Jeramy Breaux    Steri Strips:    _____yes __x___no    Band aid:    __x___yes_____no    Tape and guaze:    _____yes __x___no    Tolerated procedure:    ___x__yes _____no

## 2019-10-21 NOTE — PROGRESS NOTES
Post procedure call completed    Bleeding: _____yes ___x__no    Pain: _____yes ___x___no    Redness/Swelling: ______yes __x____no    Band aid removed: __x___yes _____no    Steri-Strips intact: ______yes _____no n/a cyst asp only

## 2020-06-29 ENCOUNTER — TELEPHONE (OUTPATIENT)
Dept: OBGYN CLINIC | Facility: CLINIC | Age: 50
End: 2020-06-29

## 2020-06-30 ENCOUNTER — OFFICE VISIT (OUTPATIENT)
Dept: OBGYN CLINIC | Facility: CLINIC | Age: 50
End: 2020-06-30
Payer: COMMERCIAL

## 2020-06-30 VITALS
DIASTOLIC BLOOD PRESSURE: 68 MMHG | WEIGHT: 138 LBS | BODY MASS INDEX: 25.24 KG/M2 | SYSTOLIC BLOOD PRESSURE: 120 MMHG | TEMPERATURE: 98.7 F

## 2020-06-30 DIAGNOSIS — Z12.11 SCREENING FOR COLON CANCER: ICD-10-CM

## 2020-06-30 DIAGNOSIS — D21.9 FIBROIDS: Primary | ICD-10-CM

## 2020-06-30 DIAGNOSIS — R10.2 PELVIC PAIN: ICD-10-CM

## 2020-06-30 PROCEDURE — 99214 OFFICE O/P EST MOD 30 MIN: CPT | Performed by: OBSTETRICS & GYNECOLOGY

## 2020-06-30 RX ORDER — LORATADINE 10 MG/1
10 TABLET ORAL AS NEEDED
COMMUNITY

## 2020-07-17 ENCOUNTER — HOSPITAL ENCOUNTER (OUTPATIENT)
Dept: RADIOLOGY | Age: 50
Discharge: HOME | End: 2020-07-17
Payer: COMMERCIAL

## 2020-07-17 DIAGNOSIS — R10.2 PELVIC PAIN: ICD-10-CM

## 2020-07-17 DIAGNOSIS — D21.9 FIBROIDS: ICD-10-CM

## 2020-07-17 PROCEDURE — 76856 US EXAM PELVIC COMPLETE: CPT

## 2020-07-17 PROCEDURE — 76830 TRANSVAGINAL US NON-OB: CPT

## 2020-07-27 NOTE — RESULT ENCOUNTER NOTE
D/w patient  Slight increase in size since 2017  She is not really having any symptoms at this time  Will monitor symptoms, if pain or heavy bleeding develops, patient will call to f/u

## 2020-10-08 ENCOUNTER — TELEPHONE (OUTPATIENT)
Dept: GASTROENTEROLOGY | Facility: CLINIC | Age: 50
End: 2020-10-08

## 2020-10-26 ENCOUNTER — ANNUAL EXAM (OUTPATIENT)
Dept: OBGYN CLINIC | Facility: CLINIC | Age: 50
End: 2020-10-26
Payer: COMMERCIAL

## 2020-10-26 VITALS
BODY MASS INDEX: 25.76 KG/M2 | SYSTOLIC BLOOD PRESSURE: 126 MMHG | WEIGHT: 140 LBS | DIASTOLIC BLOOD PRESSURE: 74 MMHG | TEMPERATURE: 98.9 F | HEIGHT: 62 IN

## 2020-10-26 DIAGNOSIS — Z12.4 ENCOUNTER FOR SCREENING FOR CERVICAL CANCER: ICD-10-CM

## 2020-10-26 DIAGNOSIS — D25.2 FIBROIDS, SUBSEROUS: ICD-10-CM

## 2020-10-26 DIAGNOSIS — Z01.419 WELL FEMALE EXAM WITH ROUTINE GYNECOLOGICAL EXAM: Primary | ICD-10-CM

## 2020-10-26 DIAGNOSIS — Z12.31 ENCOUNTER FOR SCREENING MAMMOGRAM FOR MALIGNANT NEOPLASM OF BREAST: ICD-10-CM

## 2020-10-26 PROCEDURE — 87624 HPV HI-RISK TYP POOLED RSLT: CPT | Performed by: OBSTETRICS & GYNECOLOGY

## 2020-10-26 PROCEDURE — G0145 SCR C/V CYTO,THINLAYER,RESCR: HCPCS | Performed by: OBSTETRICS & GYNECOLOGY

## 2020-10-26 PROCEDURE — S0612 ANNUAL GYNECOLOGICAL EXAMINA: HCPCS | Performed by: OBSTETRICS & GYNECOLOGY

## 2020-10-29 LAB
HPV HR 12 DNA CVX QL NAA+PROBE: NEGATIVE
HPV16 DNA CVX QL NAA+PROBE: NEGATIVE
HPV18 DNA CVX QL NAA+PROBE: NEGATIVE

## 2020-11-03 LAB
LAB AP GYN PRIMARY INTERPRETATION: NORMAL
Lab: NORMAL

## 2020-11-12 ENCOUNTER — CONSULT (OUTPATIENT)
Dept: INTERVENTIONAL RADIOLOGY/VASCULAR | Facility: CLINIC | Age: 50
End: 2020-11-12
Payer: COMMERCIAL

## 2020-11-12 VITALS
SYSTOLIC BLOOD PRESSURE: 118 MMHG | TEMPERATURE: 98 F | WEIGHT: 142 LBS | DIASTOLIC BLOOD PRESSURE: 78 MMHG | HEIGHT: 62 IN | HEART RATE: 83 BPM | BODY MASS INDEX: 26.13 KG/M2 | RESPIRATION RATE: 18 BRPM

## 2020-11-12 DIAGNOSIS — D25.2 FIBROIDS, SUBSEROUS: ICD-10-CM

## 2020-11-12 PROCEDURE — 99243 OFF/OP CNSLTJ NEW/EST LOW 30: CPT | Performed by: RADIOLOGY

## 2020-11-12 RX ORDER — MELATONIN
1000 DAILY
COMMUNITY
End: 2021-12-07 | Stop reason: ALTCHOICE

## 2020-11-16 ENCOUNTER — TELEPHONE (OUTPATIENT)
Dept: OBGYN CLINIC | Facility: CLINIC | Age: 50
End: 2020-11-16

## 2020-11-18 ENCOUNTER — OFFICE VISIT (OUTPATIENT)
Dept: GASTROENTEROLOGY | Facility: CLINIC | Age: 50
End: 2020-11-18
Payer: COMMERCIAL

## 2020-11-18 VITALS
HEIGHT: 62 IN | SYSTOLIC BLOOD PRESSURE: 120 MMHG | WEIGHT: 138 LBS | TEMPERATURE: 97.2 F | HEART RATE: 91 BPM | BODY MASS INDEX: 25.4 KG/M2 | DIASTOLIC BLOOD PRESSURE: 80 MMHG

## 2020-11-18 DIAGNOSIS — Z12.11 SCREENING FOR COLON CANCER: ICD-10-CM

## 2020-11-18 DIAGNOSIS — K58.1 IRRITABLE BOWEL SYNDROME WITH CONSTIPATION: Primary | ICD-10-CM

## 2020-11-18 PROCEDURE — 99244 OFF/OP CNSLTJ NEW/EST MOD 40: CPT | Performed by: INTERNAL MEDICINE

## 2020-11-18 RX ORDER — SODIUM, POTASSIUM,MAG SULFATES 17.5-3.13G
177 SOLUTION, RECONSTITUTED, ORAL ORAL ONCE
Qty: 2 BOTTLE | Refills: 0 | Status: ON HOLD | OUTPATIENT
Start: 2020-11-18 | End: 2021-02-22

## 2020-11-18 RX ORDER — POLYETHYLENE GLYCOL 3350 17 G/17G
17 POWDER, FOR SOLUTION ORAL DAILY
Qty: 510 G | Refills: 3 | Status: ON HOLD | OUTPATIENT
Start: 2020-11-18 | End: 2021-02-22

## 2020-11-20 LAB
ALBUMIN SERPL-MCNC: 4.2 G/DL (ref 3.6–5.1)
ALBUMIN/GLOB SERPL: 1.9 (CALC) (ref 1–2.5)
ALP SERPL-CCNC: 54 U/L (ref 37–153)
ALT SERPL-CCNC: 15 U/L (ref 6–29)
AST SERPL-CCNC: 19 U/L (ref 10–35)
BILIRUB SERPL-MCNC: 0.4 MG/DL (ref 0.2–1.2)
BUN SERPL-MCNC: 16 MG/DL (ref 7–25)
BUN/CREAT SERPL: NORMAL (CALC) (ref 6–22)
CALCIUM SERPL-MCNC: 9.2 MG/DL (ref 8.6–10.4)
CHLORIDE SERPL-SCNC: 106 MMOL/L (ref 98–110)
CO2 SERPL-SCNC: 25 MMOL/L (ref 20–32)
CREAT SERPL-MCNC: 0.93 MG/DL (ref 0.5–1.05)
ERYTHROCYTE [DISTWIDTH] IN BLOOD BY AUTOMATED COUNT: 13.3 % (ref 11–15)
GLOBULIN SER CALC-MCNC: 2.2 G/DL (CALC) (ref 1.9–3.7)
GLUCOSE SERPL-MCNC: 93 MG/DL (ref 65–99)
HCT VFR BLD AUTO: 39.4 % (ref 35–45)
HGB BLD-MCNC: 13 G/DL (ref 11.7–15.5)
IGA SERPL-MCNC: 139 MG/DL (ref 47–310)
MCH RBC QN AUTO: 29.4 PG (ref 27–33)
MCHC RBC AUTO-ENTMCNC: 33 G/DL (ref 32–36)
MCV RBC AUTO: 89.1 FL (ref 80–100)
PLATELET # BLD AUTO: 297 THOUSAND/UL (ref 140–400)
PMV BLD REES-ECKER: 11 FL (ref 7.5–12.5)
POTASSIUM SERPL-SCNC: 4.2 MMOL/L (ref 3.5–5.3)
PROT SERPL-MCNC: 6.4 G/DL (ref 6.1–8.1)
RBC # BLD AUTO: 4.42 MILLION/UL (ref 3.8–5.1)
SL AMB EGFR AFRICAN AMERICAN: 83 ML/MIN/1.73M2
SL AMB EGFR NON AFRICAN AMERICAN: 72 ML/MIN/1.73M2
SODIUM SERPL-SCNC: 140 MMOL/L (ref 135–146)
TTG IGA SER-ACNC: 1 U/ML
WBC # BLD AUTO: 5.1 THOUSAND/UL (ref 3.8–10.8)

## 2020-11-24 ENCOUNTER — CONSULT (OUTPATIENT)
Dept: GYNECOLOGY | Facility: CLINIC | Age: 50
End: 2020-11-24
Payer: COMMERCIAL

## 2020-11-24 VITALS
DIASTOLIC BLOOD PRESSURE: 80 MMHG | HEART RATE: 91 BPM | SYSTOLIC BLOOD PRESSURE: 120 MMHG | WEIGHT: 140 LBS | HEIGHT: 62 IN | BODY MASS INDEX: 25.76 KG/M2

## 2020-11-24 DIAGNOSIS — R10.31 RIGHT LOWER QUADRANT ABDOMINAL PAIN: ICD-10-CM

## 2020-11-24 DIAGNOSIS — D21.9 FIBROIDS: ICD-10-CM

## 2020-11-24 DIAGNOSIS — Z01.810 PRE-OPERATIVE CARDIOVASCULAR EXAMINATION: ICD-10-CM

## 2020-11-24 DIAGNOSIS — N92.0 MENORRHAGIA WITH REGULAR CYCLE: Primary | ICD-10-CM

## 2020-11-24 PROCEDURE — 88305 TISSUE EXAM BY PATHOLOGIST: CPT | Performed by: PATHOLOGY

## 2020-11-24 PROCEDURE — 99244 OFF/OP CNSLTJ NEW/EST MOD 40: CPT | Performed by: OBSTETRICS & GYNECOLOGY

## 2020-11-24 PROCEDURE — 88342 IMHCHEM/IMCYTCHM 1ST ANTB: CPT | Performed by: PATHOLOGY

## 2020-12-30 ENCOUNTER — TELEPHONE (OUTPATIENT)
Dept: GASTROENTEROLOGY | Facility: CLINIC | Age: 50
End: 2020-12-30

## 2020-12-31 DIAGNOSIS — R19.8 IRREGULAR BOWEL HABITS: ICD-10-CM

## 2020-12-31 DIAGNOSIS — R10.84 GENERALIZED ABDOMINAL PAIN: Primary | ICD-10-CM

## 2021-01-04 LAB
ABO GROUP BLD: NORMAL
BLD GP AB SCN SERPL QL: NORMAL
EST. AVERAGE GLUCOSE BLD GHB EST-MCNC: 114 (CALC)
EST. AVERAGE GLUCOSE BLD GHB EST-SCNC: 6.3 (CALC)
HBA1C MFR BLD: 5.6 % OF TOTAL HGB
RH BLD: NORMAL

## 2021-01-25 ENCOUNTER — ANESTHESIA EVENT (OUTPATIENT)
Dept: GASTROENTEROLOGY | Facility: MEDICAL CENTER | Age: 51
End: 2021-01-25

## 2021-01-26 NOTE — ANESTHESIA PREPROCEDURE EVALUATION
Procedure:  COLONOSCOPY    Relevant Problems   ANESTHESIA (within normal limits)      CARDIO (within normal limits)      ENDO (within normal limits)      GI/HEPATIC (within normal limits)      /RENAL (within normal limits)      GYN (within normal limits)      HEMATOLOGY (within normal limits)      MUSCULOSKELETAL (within normal limits)      NEURO/PSYCH (within normal limits)      PULMONARY (within normal limits)        Physical Exam    Airway    Mallampati score: III  TM Distance: >3 FB  Neck ROM: full     Dental   No notable dental hx     Cardiovascular  Rhythm: regular, Rate: normal, Cardiovascular exam normal    Pulmonary  Pulmonary exam normal Breath sounds clear to auscultation,     Other Findings        Anesthesia Plan  ASA Score- 1     Anesthesia Type- IV sedation with anesthesia with ASA Monitors  Additional Monitors:   Airway Plan:           Plan Factors-Exercise tolerance (METS): >4 METS  Chart reviewed  Patient summary reviewed  Patient is not a current smoker  Patient instructed to abstain from smoking on day of procedure  Patient did not smoke on day of surgery  Induction- intravenous  Postoperative Plan-     Informed Consent- Anesthetic plan and risks discussed with patient

## 2021-01-27 ENCOUNTER — ANESTHESIA (OUTPATIENT)
Dept: GASTROENTEROLOGY | Facility: MEDICAL CENTER | Age: 51
End: 2021-01-27

## 2021-01-27 ENCOUNTER — HOSPITAL ENCOUNTER (OUTPATIENT)
Dept: GASTROENTEROLOGY | Facility: MEDICAL CENTER | Age: 51
Setting detail: OUTPATIENT SURGERY
Discharge: HOME | End: 2021-01-27
Attending: INTERNAL MEDICINE | Admitting: INTERNAL MEDICINE
Payer: COMMERCIAL

## 2021-01-27 VITALS
OXYGEN SATURATION: 100 % | BODY MASS INDEX: 24.84 KG/M2 | DIASTOLIC BLOOD PRESSURE: 83 MMHG | RESPIRATION RATE: 16 BRPM | TEMPERATURE: 97.1 F | WEIGHT: 135 LBS | HEIGHT: 62 IN | SYSTOLIC BLOOD PRESSURE: 138 MMHG | HEART RATE: 78 BPM

## 2021-01-27 VITALS — HEART RATE: 77 BPM

## 2021-01-27 DIAGNOSIS — K62.89 RECTAL PAIN: Primary | ICD-10-CM

## 2021-01-27 DIAGNOSIS — Z12.11 SCREENING FOR COLON CANCER: ICD-10-CM

## 2021-01-27 LAB
EXT PREGNANCY TEST URINE: NEGATIVE
EXT. CONTROL: NORMAL

## 2021-01-27 PROCEDURE — 88305 TISSUE EXAM BY PATHOLOGIST: CPT | Performed by: PATHOLOGY

## 2021-01-27 PROCEDURE — 45380 COLONOSCOPY AND BIOPSY: CPT | Performed by: INTERNAL MEDICINE

## 2021-01-27 PROCEDURE — 81025 URINE PREGNANCY TEST: CPT | Performed by: ANESTHESIOLOGY

## 2021-01-27 RX ORDER — LIDOCAINE HYDROCHLORIDE 20 MG/ML
INJECTION, SOLUTION EPIDURAL; INFILTRATION; INTRACAUDAL; PERINEURAL AS NEEDED
Status: DISCONTINUED | OUTPATIENT
Start: 2021-01-27 | End: 2021-01-27

## 2021-01-27 RX ORDER — SODIUM CHLORIDE 9 MG/ML
125 INJECTION, SOLUTION INTRAVENOUS CONTINUOUS
Status: DISCONTINUED | OUTPATIENT
Start: 2021-01-27 | End: 2021-01-31 | Stop reason: HOSPADM

## 2021-01-27 RX ORDER — PROPOFOL 10 MG/ML
INJECTION, EMULSION INTRAVENOUS AS NEEDED
Status: DISCONTINUED | OUTPATIENT
Start: 2021-01-27 | End: 2021-01-27

## 2021-01-27 RX ADMIN — PROPOFOL 50 MG: 10 INJECTION, EMULSION INTRAVENOUS at 14:31

## 2021-01-27 RX ADMIN — SODIUM CHLORIDE 125 ML/HR: 0.9 INJECTION, SOLUTION INTRAVENOUS at 13:32

## 2021-01-27 RX ADMIN — PROPOFOL 100 MG: 10 INJECTION, EMULSION INTRAVENOUS at 14:26

## 2021-01-27 RX ADMIN — LIDOCAINE HYDROCHLORIDE 60 MG: 20 INJECTION, SOLUTION EPIDURAL; INFILTRATION; INTRACAUDAL; PERINEURAL at 14:26

## 2021-01-27 RX ADMIN — PROPOFOL 50 MG: 10 INJECTION, EMULSION INTRAVENOUS at 14:36

## 2021-01-27 RX ADMIN — Medication 40 MG: at 14:31

## 2021-01-27 NOTE — H&P
History and Physical - [facility]  Chriss Santos 45 y o  female MRN: [id number]                  HPI: Chriss Santos is a 45y o  year old female who presents for screening colonoscopy    REVIEW OF SYSTEMS: Per the HPI, and otherwise unremarkable      Historical Information   Past Medical History:   Diagnosis Date    Abnormal Pap smear of cervix     ascus    Fibrocystic breast     Fibroid     Migraine     in the past     Past Surgical History:   Procedure Laterality Date    APPENDECTOMY      CERVICAL POLYPECTOMY      US BREAST CYST ASPIRATION RIGHT INITIAL Right 10/18/2019     Social History   Social History     Substance and Sexual Activity   Alcohol Use Yes    Comment: social     Social History     Substance and Sexual Activity   Drug Use Never     Social History     Tobacco Use   Smoking Status Never Smoker   Smokeless Tobacco Never Used     Family History   Problem Relation Age of Onset    Heart disease Mother     Coronary artery disease Mother     Hypothyroidism Mother     Breast cancer Mother 46    Heart disease Father     Coronary artery disease Father     Thyroid disease Maternal Grandmother     Diabetes Maternal Grandmother     No Known Problems Maternal Grandfather     No Known Problems Sister     No Known Problems Daughter     No Known Problems Paternal Grandmother     Melanoma Paternal Grandfather     No Known Problems Son        Meds/Allergies       Current Outpatient Medications:     aspirin 81 MG tablet    Biotin 1 MG CAPS    cholecalciferol (VITAMIN D3) 1,000 units tablet    multivitamin (THERAGRAN) TABS    loratadine (CLARITIN) 10 mg tablet    Na Sulfate-K Sulfate-Mg Sulf (Suprep Bowel Prep Kit) 17 5-3 13-1 6 GM/177ML SOLN    polyethylene glycol (GLYCOLAX) 17 GM/SCOOP powder    Current Facility-Administered Medications:     sodium chloride 0 9 % infusion, 125 mL/hr, Intravenous, Continuous, 125 mL/hr at 01/27/21 1332    No Known Allergies    Objective     BP 140/85   Pulse 85   Temp (!) 97 1 °F (36 2 °C) (Temporal)   Resp 20   Ht 5' 2" (1 575 m)   Wt 61 2 kg (135 lb)   SpO2 99%   BMI 24 69 kg/m²       PHYSICAL EXAM    Gen: NAD  CV: RRR  CHEST: Clear  ABD: soft, NT/ND  EXT: no edema      ASSESSMENT/PLAN:  This is a 45y o  year old female here for screening colonoscopy, and she is stable and optimized for her procedure

## 2021-01-27 NOTE — DISCHARGE INSTRUCTIONS
Colonoscopy   WHAT YOU NEED TO KNOW:   A colonoscopy is a procedure to examine the inside of your colon (intestine) with a scope  Polyps or tissue growths may have been removed during your colonoscopy  It is normal to feel bloated and to have some abdominal discomfort  You should be passing gas  If you have hemorrhoids or you had polyps removed, you may have a small amount of bleeding  DISCHARGE INSTRUCTIONS:   Seek care immediately if:   · You have a large amount of bright red blood in your bowel movements  · Your abdomen is hard and firm and you have severe pain  · You have sudden trouble breathing  Contact your healthcare provider if:   · You develop a rash or hives  · You have a fever within 24 hours of your procedure       · You have not had a bowel movement for 3 days after your procedure  · You have questions or concerns about your condition or care  Activity:   · Do not lift, strain, or run  for 3 days after your procedure  · Rest after your procedure  You have been given medicine to relax you  Do not  drive or make important decisions until the day after your procedure  Return to your normal activity as directed  · Relieve gas and discomfort from bloating  by lying on your right side with a heating pad on your abdomen  You may need to take short walks to help the gas move out  Eat small meals until bloating is relieved  If you had polyps removed: For 7 days after your procedure:  · Do not  take aspirin  · Do not  go on long car rides  Follow up with your healthcare provider as directed:  Write down your questions so you remember to ask them during your visits  © 2017 [address] is for End User's use only and may not be sold, redistributed or otherwise used for commercial purposes  All illustrations and images included in CareNotes® are the copyrighted property of [employer]  or Samy Baz[employer]    The above information is an  only  It is not intended as medical advice for individual conditions or treatments  Talk to your doctor, nurse or pharmacist before following any medical regimen to see if it is safe and effective for you  Colorectal Polyps   WHAT YOU NEED TO KNOW:   What are colorectal polyps? Colorectal polyps are small growths of tissue in the lining of the colon and rectum  Most polyps are hyperplastic polyps and are usually benign (noncancerous)  Certain types of polyps, called adenomatous polyps, may turn into cancer  What increases my risk of colorectal polyps? The exact cause of colorectal polyps is unknown  The following may increase your risk:  · Older age    · A diet of foods high in fat and low in fiber     · Family history of polyps    · Intestinal diseases, such as Crohn's disease or ulcerative colitis    · An unhealthy lifestyle, such as physical inactivity, smoking, or drinking alcohol    · Obesity    What are the signs and symptoms of colorectal polyps? · Blood in your bowel movement or bleeding from the rectum    · Change in bowel movement habits, such as diarrhea and constipation    · Abdominal pain    How are colorectal polyps diagnosed? You should have fecal blood screening once a year for colorectal disease if you are over 48years old  You should be screened earlier if you have an intestinal disease or a family history of polyps or colorectal cancer  During this screening, a sample of your bowel movement is checked for blood, which may be an early sign of colorectal polyps or cancer  You may also need any of the following tests:  · Digital rectal exam:  Your healthcare provider will examine your anus and use a finger to check your rectum for polyps  · Barium enema: A barium enema is an x-ray of the colon  A tube is put into your anus, and a liquid called barium is put through the tube  Barium is used so that healthcare providers can see your colon better on the x-ray film  · Virtual colonoscopy: This is a CT scan that takes pictures of the inside of your colon and rectum  A small, flexible tube is put into your rectum and air or carbon dioxide (gas) is used to expand your colon  This lets healthcare providers clearly see your colon and any polyps on a monitor  · Colonoscopy or sigmoidoscopy: These procedures help your healthcare provider see the inside of your colon using a flexible tube with a small light and camera on the end  During a sigmoidoscopy, your healthcare provider will only look at rectum and lower colon  During a colonoscopy, healthcare providers will look at the full length of your colon  Healthcare providers may remove a small amount of tissue from the colon for a biopsy  How are colorectal polyps treated? A polypectomy is a minimally invasive procedure to remove your polyps  They may be removed during a colonoscopy or sigmoidoscopy  Your healthcare provider may need to remove the polyps with a laparoscope  Laparoscopy is done by inserting a small, flexible scope into incisions made on your abdomen  What are the risks of colorectal polyps? You may bleed during a colonoscopy procedure  Your bowel may be perforated (torn) when polyps are removed  This may lead to an open abdominal surgery  During surgery, you may bleed too much or get an infection  Adenomatous polyps that are not removed may turn into cancer and become more difficult to treat  Where can I find support and more information? · [facility] ([facility])  [address], [place] [address]  Phone: [phone]  Web Address: Pastor Montano  [facility] gov    When should I contact my healthcare provider? · You have a fever  · You have chills, a cough, or feel weak and achy  · You have abdominal pain that does not go away or gets worse after you take medicine  · Your abdomen is swollen       · You are losing weight without trying  · You have questions or concerns about your condition or care  When should I seek immediate care or call 911? · You have sudden shortness of breath  · You have a fast heart rate, fast breathing, or are too dizzy to stand up  · You have severe abdominal pain  · You see blood in your bowel movement  CARE AGREEMENT:   You have the right to help plan your care  Learn about your health condition and how it may be treated  Discuss treatment options with your healthcare providers to decide what care you want to receive  You always have the right to refuse treatment  The above information is an  only  It is not intended as medical advice for individual conditions or treatments  Talk to your doctor, nurse or pharmacist before following any medical regimen to see if it is safe and effective for you  © Copyright [address] Information is for End User's use only and may not be sold, redistributed or otherwise used for commercial purposes   All illustrations and images included in CareNotes® are the copyrighted property of [employer]  or [address] [place] [address]

## 2021-02-04 ENCOUNTER — TELEPHONE (OUTPATIENT)
Dept: GASTROENTEROLOGY | Facility: CLINIC | Age: 51
End: 2021-02-04

## 2021-02-04 ENCOUNTER — PREP FOR PROCEDURE (OUTPATIENT)
Dept: GYNECOLOGY | Facility: CLINIC | Age: 51
End: 2021-02-04

## 2021-02-04 DIAGNOSIS — D25.9 UTERINE LEIOMYOMA, UNSPECIFIED LOCATION: Primary | ICD-10-CM

## 2021-02-04 NOTE — TELEPHONE ENCOUNTER
Left message with full details and if they have any questions to feel free to call me back  Recall order has been entered

## 2021-02-04 NOTE — TELEPHONE ENCOUNTER
----- Message from Shaka Bond MD sent at 1/28/2021 12:26 PM EST -----  Please call patient with pathology results - without any precancerous potential, should have a repeat colonoscopy in 7-10 years

## 2021-02-16 PROCEDURE — NC001 PR NO CHARGE: Performed by: OBSTETRICS & GYNECOLOGY

## 2021-02-16 NOTE — H&P
Assessment/Plan:     Discussed again with patient options for fibroids and menorrhagia including following versus medical management  Also discussed uterine artery embolization  Discussed surgical management with either myomectomy or hysterectomy  Discussed supracervical versus total hysterectomy both with bilateral salpingectomy  Discussed the risks of surgery including but not limited to infection, hemorrhage, bowel, bladder, or vascular injury, with possible necessity for laparotomy  Has opted to proceed to [facility]               Diagnoses and all orders for this visit:     Menorrhagia with regular cycle  -     Tissue Exam     Fibroids     Right lower quadrant abdominal pain         Subjective:       Patient ID: Verito Ceron is a 47 y o  female      HPI  G2 P 2 for consultation regarding fibroid uterus  Patient of Dr Nicole Pollock  Patient states she has been experiencing some tenderness in her lower abdomen primarily in the right lower quadrant over the past month or 2  She states she has has had a known fibroid uterus for the past 2-3 years  Recent ultrasound revealed 2 subserosal fibroids measuring 4 4 and 5 3 cm  This ultrasound was performed in July  Patient states she continues to have regular menses although they become heavier over the past 2 months  Dr Kimbrough Hence has discussed options with patient  She has also sought consultation with Interventional Radiology for uterine artery embolization       Patient denies any nausea, vomiting, diarrhea or constipation  She does have more frequency of urination and pelvic pressure        The following portions of the patient's history were reviewed and updated as appropriate:   She  has a past medical history of Abnormal Pap smear of cervix, Fibrocystic breast, Fibroid, and Migraine    She        Patient Active Problem List     Diagnosis Date Noted    Abnormal mammogram 02/15/2017    Dense breasts 02/08/2017    IBS (irritable bowel syndrome) 02/08/2017    Fibroids, subserous 02/08/2017    Ulcer 02/08/2017      She  has a past surgical history that includes Appendectomy; Cervical polypectomy; and US breast cyst aspiration right initial (Right, 10/18/2019)  Her family history includes Breast cancer (age of onset: 46) in her mother; Coronary artery disease in her father and mother; Diabetes in her maternal grandmother; Heart disease in her father and mother; Hypothyroidism in her mother; Melanoma in her paternal grandfather; No Known Problems in her daughter, maternal grandfather, paternal grandmother, sister, and son; Thyroid disease in her maternal grandmother  She  reports that she has never smoked  She has never used smokeless tobacco  She reports current alcohol use  She reports that she does not use drugs           Current Outpatient Medications   Medication Sig Dispense Refill    aspirin 81 MG tablet Take by mouth        Biotin 1 MG CAPS Take by mouth        cholecalciferol (VITAMIN D3) 1,000 units tablet Take 1,000 Units by mouth daily        loratadine (CLARITIN) 10 mg tablet Take 10 mg by mouth daily        multivitamin (THERAGRAN) TABS Take 1 tablet by mouth daily        Na Sulfate-K Sulfate-Mg Sulf (Suprep Bowel Prep Kit) 17 5-3 13-1 6 GM/177ML SOLN Take 177 mL by mouth once for 1 dose 2 Bottle 0    polyethylene glycol (GLYCOLAX) 17 GM/SCOOP powder Take 17 g by mouth daily 510 g 3      No current facility-administered medications for this visit             Current Outpatient Medications on File Prior to Visit   Medication Sig    aspirin 81 MG tablet Take by mouth    Biotin 1 MG CAPS Take by mouth    cholecalciferol (VITAMIN D3) 1,000 units tablet Take 1,000 Units by mouth daily    loratadine (CLARITIN) 10 mg tablet Take 10 mg by mouth daily    multivitamin (THERAGRAN) TABS Take 1 tablet by mouth daily    Na Sulfate-K Sulfate-Mg Sulf (Suprep Bowel Prep Kit) 17 5-3 13-1 6 GM/177ML SOLN Take 177 mL by mouth once for 1 dose    polyethylene glycol (GLYCOLAX) 17 GM/SCOOP powder Take 17 g by mouth daily      No current facility-administered medications on file prior to visit        She has No Known Allergies        Review of Systems   Constitutional: Positive for fatigue  Negative for activity change and appetite change  HENT: Negative for sore throat and trouble swallowing  Gastrointestinal: Positive for abdominal distention and abdominal pain  Negative for blood in stool, constipation, diarrhea, nausea and vomiting  Genitourinary: Positive for menstrual problem and pelvic pain  Negative for difficulty urinating, dysuria, enuresis, hematuria and vaginal discharge           Objective:        /80   Pulse 91   Ht 5' 2" (1 575 m)   Wt 63 5 kg (140 lb)   BMI 25 61 kg/m²             Physical Exam  Vitals signs reviewed  Constitutional:       Appearance: Normal appearance  She is normal weight  Neck:      Musculoskeletal: Normal range of motion and neck supple  No muscular tenderness  Cardiovascular:      Rate and Rhythm: Normal rate and regular rhythm  Pulses: Normal pulses  Heart sounds: Normal heart sounds  No murmur  Pulmonary:      Effort: Pulmonary effort is normal  No respiratory distress  Breath sounds: Normal breath sounds  Abdominal:      General: Bowel sounds are normal       Palpations: Abdomen is soft  There is mass (Fundus to 16 cm)  Tenderness: There is abdominal tenderness  There is no guarding or rebound  Hernia: No hernia is present  There is no hernia in the left inguinal area or right inguinal area  Genitourinary:     General: Normal vulva  Labia:         Right: No rash, tenderness or lesion  Left: No rash, tenderness or lesion  Vagina: Normal       Cervix: Normal       Uterus: Enlarged (Consistent with 16 week gestation)  Not deviated, not fixed, not tender and no uterine prolapse         Adnexa: Right adnexa normal and left adnexa normal    Lymphadenopathy:      Cervical: No cervical adenopathy  Lower Body: No right inguinal adenopathy  No left inguinal adenopathy  Neurological:      Mental Status: She is alert       endometrial biopsy:  Cervix prepped with Betadine  Anterior lip cervix was grasped with single-tooth tenaculum  A Pipelle was inserted  The Pipelle was removed in a circumferential manner obtaining a sample from all 4 quadrants  Patient tolerated procedure well

## 2021-02-17 NOTE — PRE-PROCEDURE INSTRUCTIONS
Pre-Surgery Instructions:   Medication Instructions    aspirin 81 MG tablet Pt is holding one wee prior to surgery    Biotin 1 MG CAPS Hold for one week prior to surgery    cholecalciferol (VITAMIN D3) 1,000 units tablet Hold for one week prior to surgery    loratadine (CLARITIN) 10 mg tablet Take as needed    multivitamin (THERAGRAN) TABS Hold for one week prior to surgery

## 2021-02-19 ENCOUNTER — ANESTHESIA EVENT (OUTPATIENT)
Dept: PERIOP | Facility: HOSPITAL | Age: 51
End: 2021-02-19
Payer: COMMERCIAL

## 2021-02-22 ENCOUNTER — ANESTHESIA (OUTPATIENT)
Dept: PERIOP | Facility: HOSPITAL | Age: 51
End: 2021-02-22
Payer: COMMERCIAL

## 2021-02-22 ENCOUNTER — HOSPITAL ENCOUNTER (OUTPATIENT)
Facility: HOSPITAL | Age: 51
Setting detail: OUTPATIENT SURGERY
Discharge: HOME | End: 2021-02-22
Attending: OBSTETRICS & GYNECOLOGY | Admitting: OBSTETRICS & GYNECOLOGY
Payer: COMMERCIAL

## 2021-02-22 VITALS
OXYGEN SATURATION: 98 % | BODY MASS INDEX: 24.84 KG/M2 | HEART RATE: 100 BPM | WEIGHT: 135 LBS | DIASTOLIC BLOOD PRESSURE: 86 MMHG | TEMPERATURE: 98.2 F | SYSTOLIC BLOOD PRESSURE: 148 MMHG | HEIGHT: 62 IN | RESPIRATION RATE: 16 BRPM

## 2021-02-22 VITALS — HEART RATE: 85 BPM

## 2021-02-22 DIAGNOSIS — D25.9 UTERINE LEIOMYOMA, UNSPECIFIED LOCATION: ICD-10-CM

## 2021-02-22 DIAGNOSIS — G89.18 POSTOPERATIVE PAIN: Primary | ICD-10-CM

## 2021-02-22 PROBLEM — IMO0002 ULCER: Status: RESOLVED | Noted: 2017-02-08 | Resolved: 2021-02-22

## 2021-02-22 PROBLEM — R51.9 EPISODIC HEADACHE: Status: ACTIVE | Noted: 2021-02-22

## 2021-02-22 LAB
ABO GROUP BLD: NORMAL
BLD GP AB SCN SERPL QL: NEGATIVE
ERYTHROCYTE [DISTWIDTH] IN BLOOD BY AUTOMATED COUNT: 12.9 % (ref 11.6–15.1)
EXT PREGNANCY TEST URINE: NEGATIVE
EXT. CONTROL: NORMAL
HCT VFR BLD AUTO: 39.2 % (ref 34.8–46.1)
HGB BLD-MCNC: 13.1 G/DL (ref 11.5–15.4)
MCH RBC QN AUTO: 30.3 PG (ref 26.8–34.3)
MCHC RBC AUTO-ENTMCNC: 33.4 G/DL (ref 31.4–37.4)
MCV RBC AUTO: 91 FL (ref 82–98)
PLATELET # BLD AUTO: 232 THOUSANDS/UL (ref 149–390)
PMV BLD AUTO: 10.6 FL (ref 8.9–12.7)
RBC # BLD AUTO: 4.32 MILLION/UL (ref 3.81–5.12)
RH BLD: POSITIVE
SPECIMEN EXPIRATION DATE: NORMAL
WBC # BLD AUTO: 5.39 THOUSAND/UL (ref 4.31–10.16)

## 2021-02-22 PROCEDURE — 86850 RBC ANTIBODY SCREEN: CPT | Performed by: OBSTETRICS & GYNECOLOGY

## 2021-02-22 PROCEDURE — 86900 BLOOD TYPING SEROLOGIC ABO: CPT | Performed by: OBSTETRICS & GYNECOLOGY

## 2021-02-22 PROCEDURE — 58544 LSH W/T/O UTERUS ABOVE 250 G: CPT | Performed by: OBSTETRICS & GYNECOLOGY

## 2021-02-22 PROCEDURE — 86901 BLOOD TYPING SEROLOGIC RH(D): CPT | Performed by: OBSTETRICS & GYNECOLOGY

## 2021-02-22 PROCEDURE — 85027 COMPLETE CBC AUTOMATED: CPT | Performed by: ANESTHESIOLOGY

## 2021-02-22 PROCEDURE — 81025 URINE PREGNANCY TEST: CPT | Performed by: OBSTETRICS & GYNECOLOGY

## 2021-02-22 PROCEDURE — 88307 TISSUE EXAM BY PATHOLOGIST: CPT | Performed by: PATHOLOGY

## 2021-02-22 RX ORDER — ONDANSETRON 2 MG/ML
INJECTION INTRAMUSCULAR; INTRAVENOUS AS NEEDED
Status: DISCONTINUED | OUTPATIENT
Start: 2021-02-22 | End: 2021-02-22

## 2021-02-22 RX ORDER — FENTANYL CITRATE/PF 50 MCG/ML
50 SYRINGE (ML) INJECTION
Status: DISCONTINUED | OUTPATIENT
Start: 2021-02-22 | End: 2021-02-22 | Stop reason: HOSPADM

## 2021-02-22 RX ORDER — PROPOFOL 10 MG/ML
INJECTION, EMULSION INTRAVENOUS AS NEEDED
Status: DISCONTINUED | OUTPATIENT
Start: 2021-02-22 | End: 2021-02-22

## 2021-02-22 RX ORDER — ACETAMINOPHEN 325 MG/1
650 TABLET ORAL EVERY 6 HOURS PRN
Status: DISCONTINUED | OUTPATIENT
Start: 2021-02-22 | End: 2021-02-22 | Stop reason: HOSPADM

## 2021-02-22 RX ORDER — GLYCOPYRROLATE 0.2 MG/ML
INJECTION INTRAMUSCULAR; INTRAVENOUS AS NEEDED
Status: DISCONTINUED | OUTPATIENT
Start: 2021-02-22 | End: 2021-02-22

## 2021-02-22 RX ORDER — OXYCODONE HYDROCHLORIDE 5 MG/1
5 TABLET ORAL EVERY 4 HOURS PRN
Status: DISCONTINUED | OUTPATIENT
Start: 2021-02-22 | End: 2021-02-22 | Stop reason: HOSPADM

## 2021-02-22 RX ORDER — OXYCODONE HYDROCHLORIDE AND ACETAMINOPHEN 5; 325 MG/1; MG/1
1 TABLET ORAL EVERY 4 HOURS PRN
Qty: 15 TABLET | Refills: 0 | Status: SHIPPED | OUTPATIENT
Start: 2021-02-22 | End: 2021-12-07 | Stop reason: ALTCHOICE

## 2021-02-22 RX ORDER — ONDANSETRON 2 MG/ML
4 INJECTION INTRAMUSCULAR; INTRAVENOUS EVERY 6 HOURS PRN
Status: DISCONTINUED | OUTPATIENT
Start: 2021-02-22 | End: 2021-02-22 | Stop reason: HOSPADM

## 2021-02-22 RX ORDER — ONDANSETRON 2 MG/ML
4 INJECTION INTRAMUSCULAR; INTRAVENOUS ONCE AS NEEDED
Status: DISCONTINUED | OUTPATIENT
Start: 2021-02-22 | End: 2021-02-22 | Stop reason: HOSPADM

## 2021-02-22 RX ORDER — HYDROMORPHONE HCL/PF 1 MG/ML
0.5 SYRINGE (ML) INJECTION
Status: DISCONTINUED | OUTPATIENT
Start: 2021-02-22 | End: 2021-02-22 | Stop reason: HOSPADM

## 2021-02-22 RX ORDER — KETOROLAC TROMETHAMINE 30 MG/ML
INJECTION, SOLUTION INTRAMUSCULAR; INTRAVENOUS AS NEEDED
Status: DISCONTINUED | OUTPATIENT
Start: 2021-02-22 | End: 2021-02-22

## 2021-02-22 RX ORDER — MIDAZOLAM HYDROCHLORIDE 2 MG/2ML
INJECTION, SOLUTION INTRAMUSCULAR; INTRAVENOUS AS NEEDED
Status: DISCONTINUED | OUTPATIENT
Start: 2021-02-22 | End: 2021-02-22

## 2021-02-22 RX ORDER — FENTANYL CITRATE 50 UG/ML
INJECTION, SOLUTION INTRAMUSCULAR; INTRAVENOUS AS NEEDED
Status: DISCONTINUED | OUTPATIENT
Start: 2021-02-22 | End: 2021-02-22

## 2021-02-22 RX ORDER — NEOSTIGMINE METHYLSULFATE 1 MG/ML
INJECTION INTRAVENOUS AS NEEDED
Status: DISCONTINUED | OUTPATIENT
Start: 2021-02-22 | End: 2021-02-22

## 2021-02-22 RX ORDER — CEFAZOLIN SODIUM 1 G/50ML
1000 SOLUTION INTRAVENOUS ONCE
Status: COMPLETED | OUTPATIENT
Start: 2021-02-22 | End: 2021-02-22

## 2021-02-22 RX ORDER — ROCURONIUM BROMIDE 10 MG/ML
INJECTION, SOLUTION INTRAVENOUS AS NEEDED
Status: DISCONTINUED | OUTPATIENT
Start: 2021-02-22 | End: 2021-02-22

## 2021-02-22 RX ORDER — LIDOCAINE HYDROCHLORIDE 20 MG/ML
INJECTION, SOLUTION EPIDURAL; INFILTRATION; INTRACAUDAL; PERINEURAL AS NEEDED
Status: DISCONTINUED | OUTPATIENT
Start: 2021-02-22 | End: 2021-02-22

## 2021-02-22 RX ORDER — IBUPROFEN 600 MG/1
600 TABLET ORAL EVERY 6 HOURS PRN
Status: DISCONTINUED | OUTPATIENT
Start: 2021-02-22 | End: 2021-02-22 | Stop reason: HOSPADM

## 2021-02-22 RX ORDER — MEPERIDINE HYDROCHLORIDE 25 MG/ML
12.5 INJECTION INTRAMUSCULAR; INTRAVENOUS; SUBCUTANEOUS ONCE AS NEEDED
Status: DISCONTINUED | OUTPATIENT
Start: 2021-02-22 | End: 2021-02-22 | Stop reason: HOSPADM

## 2021-02-22 RX ORDER — SODIUM CHLORIDE 9 MG/ML
125 INJECTION, SOLUTION INTRAVENOUS CONTINUOUS
Status: DISCONTINUED | OUTPATIENT
Start: 2021-02-22 | End: 2021-02-22 | Stop reason: HOSPADM

## 2021-02-22 RX ORDER — PROMETHAZINE HYDROCHLORIDE 25 MG/ML
6.25 INJECTION, SOLUTION INTRAMUSCULAR; INTRAVENOUS ONCE AS NEEDED
Status: DISCONTINUED | OUTPATIENT
Start: 2021-02-22 | End: 2021-02-22 | Stop reason: HOSPADM

## 2021-02-22 RX ADMIN — PHENYLEPHRINE HYDROCHLORIDE 100 MCG: 10 INJECTION INTRAVENOUS at 14:22

## 2021-02-22 RX ADMIN — ONDANSETRON 4 MG: 2 INJECTION INTRAMUSCULAR; INTRAVENOUS at 15:22

## 2021-02-22 RX ADMIN — ROCURONIUM BROMIDE 50 MG: 10 INJECTION, SOLUTION INTRAVENOUS at 13:40

## 2021-02-22 RX ADMIN — FENTANYL CITRATE 100 MCG: 50 INJECTION, SOLUTION INTRAMUSCULAR; INTRAVENOUS at 13:40

## 2021-02-22 RX ADMIN — LIDOCAINE HYDROCHLORIDE 100 MG: 20 INJECTION, SOLUTION EPIDURAL; INFILTRATION; INTRACAUDAL; PERINEURAL at 13:40

## 2021-02-22 RX ADMIN — FENTANYL CITRATE 50 MCG: 50 INJECTION, SOLUTION INTRAMUSCULAR; INTRAVENOUS at 14:32

## 2021-02-22 RX ADMIN — MIDAZOLAM 2 MG: 1 INJECTION INTRAMUSCULAR; INTRAVENOUS at 13:35

## 2021-02-22 RX ADMIN — FENTANYL CITRATE 50 MCG: 50 INJECTION INTRAMUSCULAR; INTRAVENOUS at 15:55

## 2021-02-22 RX ADMIN — SODIUM CHLORIDE: 0.9 INJECTION, SOLUTION INTRAVENOUS at 14:17

## 2021-02-22 RX ADMIN — FENTANYL CITRATE 50 MCG: 50 INJECTION INTRAMUSCULAR; INTRAVENOUS at 16:06

## 2021-02-22 RX ADMIN — FENTANYL CITRATE 50 MCG: 50 INJECTION, SOLUTION INTRAMUSCULAR; INTRAVENOUS at 15:02

## 2021-02-22 RX ADMIN — SODIUM CHLORIDE 125 ML/HR: 0.9 INJECTION, SOLUTION INTRAVENOUS at 11:36

## 2021-02-22 RX ADMIN — IBUPROFEN 600 MG: 600 TABLET ORAL at 17:57

## 2021-02-22 RX ADMIN — PROPOFOL 200 MG: 10 INJECTION, EMULSION INTRAVENOUS at 13:40

## 2021-02-22 RX ADMIN — CEFAZOLIN SODIUM 1000 MG: 1 SOLUTION INTRAVENOUS at 13:30

## 2021-02-22 RX ADMIN — SODIUM CHLORIDE: 0.9 INJECTION, SOLUTION INTRAVENOUS at 15:32

## 2021-02-22 RX ADMIN — NEOSTIGMINE METHYLSULFATE 3 MG: 1 INJECTION INTRAVENOUS at 15:22

## 2021-02-22 RX ADMIN — FENTANYL CITRATE 50 MCG: 50 INJECTION INTRAMUSCULAR; INTRAVENOUS at 16:29

## 2021-02-22 RX ADMIN — GLYCOPYRROLATE 0.4 MG: 0.2 INJECTION, SOLUTION INTRAMUSCULAR; INTRAVENOUS at 15:22

## 2021-02-22 RX ADMIN — KETOROLAC TROMETHAMINE 30 MG: 30 INJECTION, SOLUTION INTRAMUSCULAR at 15:23

## 2021-02-22 NOTE — OP NOTE
OPERATIVE REPORT  PATIENT NAME: Balta Thomas    :    MRN: [id number]  Pt Location: [facility] OR ROOM 01    SURGERY DATE: 2021    Surgeon(s) and Role:     Catarina Esteves DO - Primary     * Americo Smith MD - Tiffanie Mcclendon MD - Fellow    Preop Diagnosis:  Uterine leiomyoma, unspecified location [D25 9]    Post-Op Diagnosis Codes:     * Uterine leiomyoma, unspecified location [D25 9]    Procedure(s) (LRB):  L S H , B/L SALPINGECTOMY (N/A)    Specimen(s):  ID Type Source Tests Collected by Time Destination   1 : Uterus and bilateral fallopian tubes Tissue Uterus TISSUE Jeffry Arenas DO 2021 1502        Estimated Blood Loss:   50 mL    Drains:  NG/OG/Enteral Tube Orogastric (Active)   Number of days: 0       [REMOVED] Urethral Catheter Double-lumen;Non-latex 16 Fr  (Removed)   Number of days: 0       Anesthesia Type:   General    Operative Indications:  Uterine leiomyoma, unspecified location [D25 9]      Operative Findings:  1  16 week sized bulky fibroid uterus with large 10cm pedunculated right fundal myoma  2  Normal ovaries and fallopian tubes bilaterally    Complications:   None    Procedure and Technique:  The patient was properly identified in the holding area by the operating room staff and attending physician  She was taken to operating room where general anesthesia was instituted without complication  She was placed in the dorsal lithotomy position with her legs in [address] [place] [address] with care taken to avoid excessive flexion or extension of her lower extremities  Once the patient was properly   positioned, the patient was prepped and draped in normal sterile fashion  A time-out was taken  The patient was again properly identified by the operating room staff and attending physician  At this time, the patient was receiving antibiotics for prophylaxis       The uterine manipulator was placed without complications   Only the tip of the MAGALIE was used after we sounded the uterus to 9 cm  A Horan catheter was aseptically inserted  Gloves were changed, and attention was directed to the abdomen       Two Allis clamps were used to coleman the umbilicus  A 10-mm incision was created at the level of the umbilicus  A Veress needle was utilized to get into the peritoneal axis  Once we were inside  intraperitoneum, we allowed CO2 gas to go in until we reached a pressure of 15 mmHg  Once we reached that pressure, the 10-mm optical trocar was inserted under direct visualization  Once we were inside the peritoneal cavity, two additional trocars were placed on the lower quadrant approximately 2 fingerbreadths above the anterior superior iliac spine and 2 cm medial to that spot  A 10-mm incision was created under transillumination, and we always observed the inferior epigastric vessels trying to avoid injury to this vessel  Once the 3 trocars were placed, we started the procedure       First, a survey of the cavity was performed, and we confirmed the above-mentioned findings  We started a salpingectomy on the right side  The salpingectomy was performed by transecting the right fallopian tube to the mesosalpinx all the way to the level of the cornu  This was resected, and we used the LigaSure device for this purpose  The left contralateral tube was treated in the same manner  At this time, a salpingectomy was performed and the tubes were removed  The round ligament was opened on the right side, and we developed the vesicouterine space from right to left until we were able to develop the vesicovaginal space without complications  The bladder was dissected down to decrease the injury to the bladder  Once we created the vesicovaginal space, we were able to transect the utero-ovarian ligament on the right side and transected and cut  The   posterior lip of the peritoneum was skeletonized until we were able to see the uterine arteries at the level of the internal cervical os   These uterine arteries were double burned and cut without complications  The left side was treated in the same fashion, first taking the round ligament down and developing the vesicouterine space and then transecting the utero-ovarian ligament and then skeletonizing the uterine arteries  Once we secured the uterine arteries on the left side, we noticed blanching of the uterus  The Pedunculated myoma was resected at its stalk using monopolar electrocautery and the Ligasure  Then we introduced the Lyle loop  Once we were able to introduce the Lyle loop, this was introduced and placed at the level of the endocervical canal  We activated the loop and we transected the corpus of the uterus without complications  Once we transected the corpus of the uterus, a 2 5-cm incision was created at the level of the suprapubic area in the midline in a horizontal fashion  We used a scalpel to create an incision, and then dissection was created until we reached the rectus fascia  The rectus fascia was opened with a scalpel and Bui scissors  Once we were able to extend the rectus fascia laterally, we entered the peritoneal cavity with sharp dissection  This was used and extended, and we inserted a small Roland O retractor  A GelPoint was   placed, and then we obtained pneumoperitoneum again  This was achieved, and a 10-mm trocar was inserted through the GelPoint and we were able to introduce the 10-mm bag without complications  The specimens was placed in the bag, and the bag was retrieved without complications  The Roland O retractor was removed  The fascia was closed with 0 Vicryl suture in a running stitch  The skin was closed with 4-0 Monocryl       Using the laparoscopic trocar, we did a survey of the pelvic cavity and we confirmed good hemostasis  This was confirmed  The procedure was concluded  The ureters were seen without any lacerations  The patient tolerated the procedure well  The trocars were removed  The gas was allowed to escape   The skin incisions were closed with plain suture without complications  The Horan was removed, and the patient went to the recovery room in a stable condition, and she is stable at the moment of dictation  Dr Prachi Reeves was present for the entire procedure      Patient Disposition:  PACU     SIGNATURE: Elin Castro MD  DATE: February 22, 2021  TIME: 3:31 PM

## 2021-02-22 NOTE — ANESTHESIA PREPROCEDURE EVALUATION
Procedure:  L S H , B/L SALPINGECTOMY (N/A Abdomen)    Relevant Problems   ANESTHESIA (within normal limits)      CARDIO (within normal limits)      ENDO (within normal limits)      GI/HEPATIC (within normal limits)      /RENAL (within normal limits)      GYN (within normal limits)      HEMATOLOGY (within normal limits)      MUSCULOSKELETAL (within normal limits)      PULMONARY (within normal limits)      Other   (+) IBS (irritable bowel syndrome)        Physical Exam    Airway    Mallampati score: III  TM Distance: >3 FB  Neck ROM: full     Dental   No notable dental hx     Cardiovascular  Rhythm: regular, Rate: normal, Cardiovascular exam normal    Pulmonary  Pulmonary exam normal Breath sounds clear to auscultation,     Other Findings        Anesthesia Plan  ASA Score- 2     Anesthesia Type- general with ASA Monitors  Additional Monitors:   Airway Plan: ETT  Comment: Remote HA and PUD Hx--resolved   Plan Factors-Exercise tolerance (METS): >4 METS  Chart reviewed  Existing labs reviewed  Patient summary reviewed  Patient is not a current smoker  Patient instructed to abstain from smoking on day of procedure  Patient did not smoke on day of surgery  Induction- intravenous  Postoperative Plan-     Informed Consent- Anesthetic plan and risks discussed with patient and spouse Evelyn Suggs

## 2021-02-22 NOTE — DISCHARGE INSTRUCTIONS
Laparoscopic Hysterectomy   WHAT YOU SHOULD KNOW:   Laparoscopic hysterectomy [facility]) is surgery to remove your uterus only (partial hysterectomy), or your uterus and cervix (total hysterectomy)  Other organs, such as your ovaries and fallopian tubes, may also be removed  AFTER YOU LEAVE:   Medicines:   · Medicines  may be given to decrease pain or to treat or prevent a bacterial infection  Ask your primary healthcare provider [facility]) how to take prescription pain medicine safely  You may also need to take hormone medicine, such as estrogen  · Take your medicine as directed  Contact your PHP if you think your medicine is not helping or if you have side effects  Tell him if you are allergic to any medicine  Keep a list of the medicines, vitamins, and herbs you take  Include the amounts, and when and why you take them  Bring the list or the pill bottles to follow-up visits  Carry your medicine list with you in case of an emergency  Activity guidelines: You may feel like resting more after surgery  Slowly start to do more each day  Rest when you feel it is needed  Ask when you can return to your usual activities  What to expect after surgery:   · Ask your gynecologist or PHP about routine tests that you will need  · It is normal to have some bleeding from your vagina after certain types of hysterectomy surgery  Ask your gynecologist or PHP if you should expect bleeding, and how much bleeding to expect  Contact your gynecologist or PHP if:   · You have a fever  · You have pain that gets worse or does not decrease or go away with treatment  · You notice pus or a foul-smelling drainage coming from an incision, or it is red or swollen  · You have new or more bright red bleeding from your vagina or your incisions  · You have yellow, green, or foul-smelling discharge coming from your vagina  · You have trouble urinating, burning when you urinate, or feel a need to urinate often      · You have trouble having a bowel movement  · Your skin is itchy, swollen, or has a rash  · You have questions or concerns about your condition or care  Seek care immediately or call 911 if:   · You are bleeding from your vagina, or bleeding more than you were told to expect  · Your arm or leg feels warm, tender, and painful  It may look swollen and red  · You feel lightheaded, short of breath, and have chest pain  · You cough up blood  © 2014 [address] is for End User's use only and may not be sold, redistributed or otherwise used for commercial purposes  All illustrations and images included in CareNotes® are the copyrighted property of A D A M , Inc  or Samy Bazzi  The above information is an  only  It is not intended as medical advice for individual conditions or treatments  Talk to your doctor, nurse or pharmacist before following any medical regimen to see if it is safe and effective for you

## 2021-02-24 ENCOUNTER — DOCUMENTATION (OUTPATIENT)
Dept: GYNECOLOGY | Facility: CLINIC | Age: 51
End: 2021-02-24

## 2021-02-24 NOTE — PROGRESS NOTES
Called pt post op she is doing fine her pain is under control using percocet at night and advil during the day  Incisions look fine  No BM yet but feels rumbling  and gassy  Using colcace  Eating light not much of an appetite but is eating with medication doses    Does have post op appt set up

## 2021-02-26 ENCOUNTER — DOCUMENTATION (OUTPATIENT)
Dept: GYNECOLOGY | Facility: CLINIC | Age: 51
End: 2021-02-26

## 2021-02-26 NOTE — PROGRESS NOTES
Pt called for result of tissue analysis all surgically removed tissue is normal   Pt is feeling better every day

## 2021-03-10 ENCOUNTER — OFFICE VISIT (OUTPATIENT)
Dept: GYNECOLOGY | Facility: CLINIC | Age: 51
End: 2021-03-10

## 2021-03-10 VITALS
HEIGHT: 62 IN | WEIGHT: 136.6 LBS | SYSTOLIC BLOOD PRESSURE: 118 MMHG | BODY MASS INDEX: 25.14 KG/M2 | HEART RATE: 81 BPM | DIASTOLIC BLOOD PRESSURE: 74 MMHG

## 2021-03-10 DIAGNOSIS — Z48.89 POSTOPERATIVE VISIT: Primary | ICD-10-CM

## 2021-03-10 PROCEDURE — 99024 POSTOP FOLLOW-UP VISIT: CPT | Performed by: OBSTETRICS & GYNECOLOGY

## 2021-03-10 NOTE — LETTER
March 10, 3439     Alexey Pnito DO  [address]  [facility]    Patient: Michelle Blood   YOB: 1970   Date of Visit: 3/10/2021       Dear Dr Rubin Hernandez:    Thank you for referring Michelle Blood to me for evaluation  Below are my notes for this consultation  If you have questions, please do not hesitate to call me  I look forward to following your patient along with you  Sincerely,        Silvia John DO        CC: No Recipients  Silvia John DO  3/10/2021  9:47 AM  Incomplete  Patient presents for postoperative check she is status post [facility] BS on February 22nd  She is doing well since the surgery with no complaints  Pathology report revealed 563 g fibroid uterus along with extensive adenomyosis      Physical exam: Port sites healing well with no exudate or induration    Impression:  Normal postoperative check     Plan: Return to Dr Rubin Hernandez for ongoing gyn care

## 2021-03-10 NOTE — PROGRESS NOTES
Patient presents for postoperative check she is status post [facility] BS on February 22nd  She is doing well since the surgery with no complaints  Pathology report revealed 563 g fibroid uterus along with extensive adenomyosis      Physical exam: Port sites healing well with no exudate or induration    Impression:  Normal postoperative check     Plan: Return to Dr Indu Mcclain for ongoing gyn care

## 2021-03-31 DIAGNOSIS — Z23 ENCOUNTER FOR IMMUNIZATION: ICD-10-CM

## 2021-04-07 ENCOUNTER — IMMUNIZATIONS (OUTPATIENT)
Dept: FAMILY MEDICINE CLINIC | Facility: HOSPITAL | Age: 51
End: 2021-04-07

## 2021-04-07 DIAGNOSIS — Z23 ENCOUNTER FOR IMMUNIZATION: Primary | ICD-10-CM

## 2021-04-07 PROCEDURE — 91300 SARS-COV-2 / COVID-19 MRNA VACCINE (PFIZER-BIONTECH) 30 MCG: CPT

## 2021-04-07 PROCEDURE — 0001A SARS-COV-2 / COVID-19 MRNA VACCINE (PFIZER-BIONTECH) 30 MCG: CPT

## 2021-05-02 ENCOUNTER — IMMUNIZATIONS (OUTPATIENT)
Dept: FAMILY MEDICINE CLINIC | Facility: HOSPITAL | Age: 51
End: 2021-05-02

## 2021-05-02 DIAGNOSIS — Z23 ENCOUNTER FOR IMMUNIZATION: Primary | ICD-10-CM

## 2021-05-02 PROCEDURE — 0002A SARS-COV-2 / COVID-19 MRNA VACCINE (PFIZER-BIONTECH) 30 MCG: CPT

## 2021-05-02 PROCEDURE — 91300 SARS-COV-2 / COVID-19 MRNA VACCINE (PFIZER-BIONTECH) 30 MCG: CPT

## 2021-05-21 ENCOUNTER — HOSPITAL ENCOUNTER (OUTPATIENT)
Dept: RADIOLOGY | Age: 51
Discharge: HOME | End: 2021-05-21
Attending: OBSTETRICS & GYNECOLOGY
Payer: COMMERCIAL

## 2021-05-21 VITALS — HEIGHT: 62 IN | BODY MASS INDEX: 25.03 KG/M2 | WEIGHT: 136 LBS

## 2021-05-21 DIAGNOSIS — Z12.31 ENCOUNTER FOR SCREENING MAMMOGRAM FOR MALIGNANT NEOPLASM OF BREAST: ICD-10-CM

## 2021-05-21 PROCEDURE — 77067 SCR MAMMO BI INCL CAD: CPT

## 2021-05-21 PROCEDURE — 77063 BREAST TOMOSYNTHESIS BI: CPT

## 2021-06-07 ENCOUNTER — HOSPITAL ENCOUNTER (OUTPATIENT)
Dept: MAMMOGRAPHY | Facility: CLINIC | Age: 51
Discharge: HOME | End: 2021-06-07
Payer: COMMERCIAL

## 2021-06-07 ENCOUNTER — HOSPITAL ENCOUNTER (OUTPATIENT)
Dept: ULTRASOUND IMAGING | Facility: CLINIC | Age: 51
Discharge: HOME | End: 2021-06-07
Payer: COMMERCIAL

## 2021-06-07 VITALS — WEIGHT: 132 LBS | BODY MASS INDEX: 24.29 KG/M2 | HEIGHT: 62 IN

## 2021-06-07 DIAGNOSIS — R92.8 ABNORMAL SCREENING MAMMOGRAM: ICD-10-CM

## 2021-06-07 PROCEDURE — G0279 TOMOSYNTHESIS, MAMMO: HCPCS

## 2021-06-07 PROCEDURE — 77065 DX MAMMO INCL CAD UNI: CPT

## 2021-06-07 PROCEDURE — 76642 ULTRASOUND BREAST LIMITED: CPT

## 2021-06-08 ENCOUNTER — HOSPITAL ENCOUNTER (OUTPATIENT)
Dept: ULTRASOUND IMAGING | Facility: CLINIC | Age: 51
Discharge: HOME | End: 2021-06-08
Admitting: RADIOLOGY
Payer: COMMERCIAL

## 2021-06-08 ENCOUNTER — TELEPHONE (OUTPATIENT)
Dept: MAMMOGRAPHY | Facility: CLINIC | Age: 51
End: 2021-06-08

## 2021-06-08 VITALS — SYSTOLIC BLOOD PRESSURE: 134 MMHG | HEART RATE: 81 BPM | DIASTOLIC BLOOD PRESSURE: 86 MMHG

## 2021-06-08 DIAGNOSIS — R92.8 ABNORMAL MAMMOGRAM: ICD-10-CM

## 2021-06-08 PROCEDURE — 76942 ECHO GUIDE FOR BIOPSY: CPT

## 2021-06-08 PROCEDURE — 19000 PUNCTURE ASPIR CYST BREAST: CPT

## 2021-06-08 RX ORDER — LIDOCAINE HYDROCHLORIDE 10 MG/ML
5 INJECTION, SOLUTION EPIDURAL; INFILTRATION; INTRACAUDAL; PERINEURAL ONCE
Status: COMPLETED | OUTPATIENT
Start: 2021-06-08 | End: 2021-06-08

## 2021-06-08 RX ADMIN — LIDOCAINE HYDROCHLORIDE 5 ML: 10 INJECTION, SOLUTION EPIDURAL; INFILTRATION; INTRACAUDAL; PERINEURAL at 15:37

## 2021-06-08 NOTE — PROGRESS NOTES
Ice pack given:    ___x__yes _____no    Discharge instructions signed by patient:    __x___yes _____no    Discharge instructions given to patient:    ___x__yes _____no    Discharged via:    __x___ambulatory    _____wheelchair    _____stretcher    Stable on discharge:    __x___yes ____no

## 2021-06-08 NOTE — PROGRESS NOTES
Procedure type:    __x___ultrasound guided _____stereotactic    Breast:    __x___Left _____Right    Location: 2 oclock 5 cmfn    Needle: 25g BD Precision    # of passes: 1 (scant, clear fluid, discarded    Clip: N/A     Performed by: Dr Chiara Garcia held for 5 minutes by: Yary Hood    Steri Strips:    _____yes __x___no    Band aid:    __x___yes_____no    Tape and guaze:    _____yes __x___no    Tolerated procedure:    __x___yes _____no

## 2021-06-08 NOTE — PROGRESS NOTES
Spoke  with patient over the phone  Regarding Dr Oh Esteban recommendation for;    _____ RIGHT __x____LEFT  Cyst aspiration    __x___Ultrasound guided  ______Stereotactic breast biopsy  __X___Verbalized understanding        Blood thinners:  _____yes __x___no    Date stopped: ___N/A____    Biopsy teaching sheet given:  __X___yes ____no    Pt given contact information and adv to call with any questions/needs

## 2021-06-08 NOTE — DISCHARGE INSTR - OTHER ORDERS
POST LARGE CORE BREAST BIOPSY PATIENT INFORMATION      1  Place an ice pack inside your bra over the top of the dressing every hour for 20 minutes (20 minutes on, 60 minutes off)  Do this until bedtime  2  Do not shower or bathe until the following morning  3  You may bathe your breast carefully with the steri-strips in place  Be careful    Not to loosen them  The steri-strips will fall off in 3-5 days  4  You may have mild discomfort, and you may have some bruising where the   Needle entered the skin  This should clear within 5-7 days  5  If you need medicine for discomfort, take acetaminophen products such as   Tylenol  You may also take Advil or Motrin products  6  Do not participate in strenuous activities such as-tennis, aerobics, skiing,  Weight lifting, etc  for 24 hours  Refrain from swimming/soaking for 72 hours  7  Wearing a bra for sleeping may be more comfortable for the first 24-48 hours  8  Watch for continued bleeding, pain or fever over 101; please call with any questions or concerns  For procedures done at the [facility] call:  Fiordaliza Dai RN [facility] RN at [phone]                    *After 4 PM call the Interventional Radiology Department                    [phone] and ask to speak with the nurse on call  For procedures done at the [address] [place] [address] call:         Saida Peters RN at   *After 4 PM call the Interventional Radiology Department   [phone] and ask to speak with the nurse on call  For procedures done at [address] [place] [address] call: The Radiology Nurse at [phone]  *After 4 PM call your physician, or go to the Emergency Department  9          The final results of your biopsy are usually available within one week

## 2021-12-07 ENCOUNTER — OFFICE VISIT (OUTPATIENT)
Dept: FAMILY MEDICINE CLINIC | Facility: CLINIC | Age: 51
End: 2021-12-07
Payer: COMMERCIAL

## 2021-12-07 VITALS
SYSTOLIC BLOOD PRESSURE: 122 MMHG | RESPIRATION RATE: 16 BRPM | HEIGHT: 62 IN | WEIGHT: 135.8 LBS | DIASTOLIC BLOOD PRESSURE: 80 MMHG | HEART RATE: 74 BPM | BODY MASS INDEX: 24.99 KG/M2

## 2021-12-07 DIAGNOSIS — Z13.1 SCREENING FOR DIABETES MELLITUS: ICD-10-CM

## 2021-12-07 DIAGNOSIS — Z13.220 SCREENING FOR CHOLESTEROL LEVEL: ICD-10-CM

## 2021-12-07 DIAGNOSIS — Z00.00 ANNUAL PHYSICAL EXAM: Primary | ICD-10-CM

## 2021-12-07 DIAGNOSIS — Z13.29 SCREENING FOR THYROID DISORDER: ICD-10-CM

## 2021-12-07 DIAGNOSIS — E55.9 VITAMIN D DEFICIENCY: ICD-10-CM

## 2021-12-07 DIAGNOSIS — Z13.228 SCREENING FOR METABOLIC DISORDER: ICD-10-CM

## 2021-12-07 DIAGNOSIS — Z13.89 SCREENING FOR BLOOD OR PROTEIN IN URINE: ICD-10-CM

## 2021-12-07 DIAGNOSIS — Z23 NEED FOR TDAP VACCINATION: ICD-10-CM

## 2021-12-07 PROCEDURE — 99386 PREV VISIT NEW AGE 40-64: CPT | Performed by: FAMILY MEDICINE

## 2021-12-07 PROCEDURE — 3725F SCREEN DEPRESSION PERFORMED: CPT | Performed by: FAMILY MEDICINE

## 2021-12-07 PROCEDURE — 1036F TOBACCO NON-USER: CPT | Performed by: FAMILY MEDICINE

## 2021-12-07 PROCEDURE — 90715 TDAP VACCINE 7 YRS/> IM: CPT

## 2021-12-07 PROCEDURE — 90471 IMMUNIZATION ADMIN: CPT

## 2021-12-08 ENCOUNTER — ANNUAL EXAM (OUTPATIENT)
Dept: OBGYN CLINIC | Facility: CLINIC | Age: 51
End: 2021-12-08
Payer: COMMERCIAL

## 2021-12-08 VITALS
SYSTOLIC BLOOD PRESSURE: 120 MMHG | DIASTOLIC BLOOD PRESSURE: 72 MMHG | HEIGHT: 62 IN | BODY MASS INDEX: 24.84 KG/M2 | WEIGHT: 135 LBS

## 2021-12-08 DIAGNOSIS — Z12.31 ENCOUNTER FOR SCREENING MAMMOGRAM FOR MALIGNANT NEOPLASM OF BREAST: ICD-10-CM

## 2021-12-08 DIAGNOSIS — Z01.419 WELL WOMAN EXAM WITH ROUTINE GYNECOLOGICAL EXAM: Primary | ICD-10-CM

## 2021-12-08 DIAGNOSIS — R92.8 ABNORMAL MAMMOGRAM: ICD-10-CM

## 2021-12-08 DIAGNOSIS — R92.2 DENSE BREASTS: ICD-10-CM

## 2021-12-08 LAB
25(OH)D3 SERPL-MCNC: 27 NG/ML (ref 30–100)
ALBUMIN SERPL-MCNC: 4.4 G/DL (ref 3.6–5.1)
ALBUMIN/GLOB SERPL: 2 (CALC) (ref 1–2.5)
ALP SERPL-CCNC: 68 U/L (ref 37–153)
ALT SERPL-CCNC: 13 U/L (ref 6–29)
APPEARANCE UR: CLEAR
AST SERPL-CCNC: 17 U/L (ref 10–35)
BACTERIA UR QL AUTO: ABNORMAL /HPF
BILIRUB SERPL-MCNC: 0.5 MG/DL (ref 0.2–1.2)
BILIRUB UR QL STRIP: NEGATIVE
BUN SERPL-MCNC: 14 MG/DL (ref 7–25)
BUN/CREAT SERPL: NORMAL (CALC) (ref 6–22)
CALCIUM SERPL-MCNC: 9.7 MG/DL (ref 8.6–10.4)
CHLORIDE SERPL-SCNC: 104 MMOL/L (ref 98–110)
CHOLEST SERPL-MCNC: 222 MG/DL
CHOLEST/HDLC SERPL: 2.6 (CALC)
CO2 SERPL-SCNC: 27 MMOL/L (ref 20–32)
COLOR UR: YELLOW
CREAT SERPL-MCNC: 0.9 MG/DL (ref 0.5–1.05)
GLOBULIN SER CALC-MCNC: 2.2 G/DL (CALC) (ref 1.9–3.7)
GLUCOSE SERPL-MCNC: 90 MG/DL (ref 65–99)
GLUCOSE UR QL STRIP: NEGATIVE
HDLC SERPL-MCNC: 85 MG/DL
HGB UR QL STRIP: NEGATIVE
HYALINE CASTS #/AREA URNS LPF: ABNORMAL /LPF
KETONES UR QL STRIP: NEGATIVE
LDLC SERPL CALC-MCNC: 120 MG/DL (CALC)
LEUKOCYTE ESTERASE UR QL STRIP: ABNORMAL
NITRITE UR QL STRIP: NEGATIVE
NONHDLC SERPL-MCNC: 137 MG/DL (CALC)
PH UR STRIP: 5.5 [PH] (ref 5–8)
POTASSIUM SERPL-SCNC: 4.2 MMOL/L (ref 3.5–5.3)
PROT SERPL-MCNC: 6.6 G/DL (ref 6.1–8.1)
PROT UR QL STRIP: ABNORMAL
RBC #/AREA URNS HPF: ABNORMAL /HPF
SL AMB EGFR AFRICAN AMERICAN: 86 ML/MIN/1.73M2
SL AMB EGFR NON AFRICAN AMERICAN: 74 ML/MIN/1.73M2
SODIUM SERPL-SCNC: 141 MMOL/L (ref 135–146)
SP GR UR STRIP: 1.02 (ref 1–1.03)
SQUAMOUS #/AREA URNS HPF: ABNORMAL /HPF
TRIGL SERPL-MCNC: 75 MG/DL
TSH SERPL-ACNC: 1.96 MIU/L
WBC #/AREA URNS HPF: ABNORMAL /HPF

## 2021-12-08 PROCEDURE — S0612 ANNUAL GYNECOLOGICAL EXAMINA: HCPCS | Performed by: OBSTETRICS & GYNECOLOGY

## 2021-12-08 PROCEDURE — 3008F BODY MASS INDEX DOCD: CPT | Performed by: FAMILY MEDICINE

## 2021-12-16 ENCOUNTER — IMMUNIZATIONS (OUTPATIENT)
Dept: FAMILY MEDICINE CLINIC | Facility: HOSPITAL | Age: 51
End: 2021-12-16

## 2021-12-16 DIAGNOSIS — Z23 ENCOUNTER FOR IMMUNIZATION: Primary | ICD-10-CM

## 2021-12-16 PROCEDURE — 0001A COVID-19 PFIZER VACC 0.3 ML: CPT

## 2021-12-16 PROCEDURE — 91300 COVID-19 PFIZER VACC 0.3 ML: CPT

## 2022-01-06 ENCOUNTER — TELEPHONE (OUTPATIENT)
Dept: OBGYN CLINIC | Facility: CLINIC | Age: 52
End: 2022-01-06

## 2022-01-06 DIAGNOSIS — Z80.3 FAMILY HISTORY OF BREAST CANCER IN FIRST DEGREE RELATIVE: Primary | ICD-10-CM

## 2022-01-06 NOTE — TELEPHONE ENCOUNTER
Patient called, she states she saw you recently for her annual exam  She wanted to speak with you about BRCA gene testing  She states her mom has a hx of breast cancer and she is interested in doing the BRCA testing   She states she has a daughter and would like to be educated about this

## 2022-01-06 NOTE — TELEPHONE ENCOUNTER
If she'd like to come in for a discussion I'm happy to talk with her about it  A full discussion is better done at an appointment in the office  I don't typically order the BRCA genetic testing but rather send for a referral to genetic counseling through the oncology office who can discuss her family history and recommend testing that would fit her needs but also be able to explain her testing results as well  IF she would like a referral to genetic counseling that is fine too as they are likely to be able to explain the testing a bit better than I would

## 2022-01-07 NOTE — TELEPHONE ENCOUNTER
I placed a referral order for Oncology Genetics  I choose the [address] [place] [address] office at [facility] and Min Rob is a genetic counselor through the Oncology office that I send patients to  Jackie Desir is another genetic counselor that patients have seen as well

## 2022-01-12 ENCOUNTER — TELEPHONE (OUTPATIENT)
Dept: GENETICS | Facility: CLINIC | Age: 52
End: 2022-01-12

## 2022-01-12 NOTE — TELEPHONE ENCOUNTER
I called Luis Gaspar to schedule a new patient appointment with the Cancer Risk and Genetics Program       Outcome:   Spoke with patient, genetics appointment scheduled for 7/29 at 9:30 with Sukhdev Eugene

## 2022-01-21 ENCOUNTER — TELEPHONE (OUTPATIENT)
Dept: HEMATOLOGY ONCOLOGY | Facility: CLINIC | Age: 52
End: 2022-01-21

## 2022-01-21 NOTE — TELEPHONE ENCOUNTER
Patient called to reschedule her consult on 7-29-22 with Jin Saleh   Please call patient back at [phone]

## 2022-01-25 ENCOUNTER — TELEPHONE (OUTPATIENT)
Dept: GENETICS | Facility: CLINIC | Age: 52
End: 2022-01-25

## 2022-01-25 NOTE — TELEPHONE ENCOUNTER
I called patient to reschedule her 7/29 genetics appointment as she had requested, patient will be away for the summer and unsure on her return date, genetics appt rescheduled for 9/7 at 9:30 am

## 2022-03-18 LAB
APPEARANCE UR: CLEAR
BACTERIA UR QL AUTO: ABNORMAL /HPF
BILIRUB UR QL STRIP: NEGATIVE
COLOR UR: YELLOW
GLUCOSE UR QL STRIP: NEGATIVE
HGB UR QL STRIP: NEGATIVE
HYALINE CASTS #/AREA URNS LPF: ABNORMAL /LPF
KETONES UR QL STRIP: NEGATIVE
LEUKOCYTE ESTERASE UR QL STRIP: NEGATIVE
NITRITE UR QL STRIP: NEGATIVE
PH UR STRIP: ABNORMAL [PH] (ref 5–8)
PROT UR QL STRIP: ABNORMAL
RBC #/AREA URNS HPF: ABNORMAL /HPF
SP GR UR STRIP: 1.02 (ref 1–1.03)
SQUAMOUS #/AREA URNS HPF: ABNORMAL /HPF
WBC #/AREA URNS HPF: ABNORMAL /HPF

## 2022-05-25 ENCOUNTER — HOSPITAL ENCOUNTER (OUTPATIENT)
Dept: RADIOLOGY | Age: 52
Discharge: HOME | End: 2022-05-25
Payer: COMMERCIAL

## 2022-05-25 VITALS — HEIGHT: 62 IN | BODY MASS INDEX: 24.84 KG/M2 | WEIGHT: 135 LBS

## 2022-05-25 DIAGNOSIS — Z12.31 ENCOUNTER FOR SCREENING MAMMOGRAM FOR MALIGNANT NEOPLASM OF BREAST: ICD-10-CM

## 2022-05-25 PROCEDURE — 77063 BREAST TOMOSYNTHESIS BI: CPT

## 2022-05-25 PROCEDURE — 77067 SCR MAMMO BI INCL CAD: CPT

## 2022-09-02 ENCOUNTER — TELEPHONE (OUTPATIENT)
Dept: HEMATOLOGY ONCOLOGY | Facility: CLINIC | Age: 52
End: 2022-09-02

## 2022-09-07 ENCOUNTER — CLINICAL SUPPORT (OUTPATIENT)
Dept: GENETICS | Facility: CLINIC | Age: 52
End: 2022-09-07

## 2022-09-07 DIAGNOSIS — Z80.3 FAMILY HISTORY OF BREAST CANCER IN FIRST DEGREE RELATIVE: Primary | ICD-10-CM

## 2022-09-07 PROCEDURE — NC001 PR NO CHARGE: Performed by: GENETIC COUNSELOR, MS

## 2022-09-07 NOTE — LETTER
2022     Devin Rivera MD  [address]  [facility]    Patient: Beryle Brew  YOB: 1970  Date of Visit: 2022      Dear Dr Dylan Hanson:    Thank you for referring Beryle Brew to me for evaluation  Below are my notes for this consultation  If you have questions, please do not hesitate to call me  I look forward to following your patient along with you  Sincerely,        Greg Canales        CC: Phylicia Grijalva MD        Pre-Test Genetic Counseling Consult Note    Patient Name: Beryle Brew   /Age: 1970/51 y o  Referring Provider: Devin Rivera MD    Date of Service: 2022  Genetic Counselor: Greg Canales MS, [facility]  Interpretation Services: None  [facility]      Alexander Cortes was referred to the [address] [place] [address] and Genetic Assessment Program due to her family history of of breast cancer  she presents today to discuss the possibility of a hereditary cancer syndrome, options for genetic testing, and implications for her and her family  Cancer History and Treatment:   Personal History: no personal history of cancer    Screening Hx:   Breast:  Breast Imagin22  Breast Density: Heterogeneously dense   mammos every 6mo in the past, will re-evaluateif this is still needed    Colon:  Colonoscopy: 21  FINDINGS:  · One polyp measuring smaller than 5 mm in the rectosigmoid; performed cold forceps biopsy  · Colon mucosa appeared otherwise normal throughout the entire examined colon  Final Diagnosis   A  Rectosigmoid colon polyp (cold biopsy):     - Polypoid colonic mucosa with minimal surface hyperplasia       Gynecologic:  Ovaries intact ANALILIA and BS on     Skin:  On waiting list for derm    Other screening: none    Reproductive History  Age at menarche: 14  Age at first live birth: 24  Menopause: Post Menopausal (45)  Hormone replacement: none    Medical and Surgical History  Pertinent surgical history:   Past Surgical History:   Procedure Laterality Date    APPENDECTOMY      BREAST CYST ASPIRATION Left 06/08/2021    CERVICAL POLYPECTOMY      HYSTERECTOMY  02/22/2021    IA LAP, SUPRACERVIAL HYSTERECTOMY W/ TUBE&OV, <250G N/A 2/22/2021    Procedure: L S H , B/L SALPINGECTOMY;  Surgeon: Arianna Johnson DO;  [facility];  Service: Gynecology    UMBILICAL HERNIA REPAIR      US BREAST CYST ASPIRATION LEFT INITIAL Left 6/8/2021    US BREAST CYST ASPIRATION RIGHT INITIAL Right 10/18/2019      Pertinent medical history:  Past Medical History:   Diagnosis Date    Abnormal Pap smear of cervix     ascus    Bilateral ovarian cysts     Fibrocystic breast     Fibroid     uterine  LSH w/ b/l salpingectomy today 2/22/2021    Migraine     in the past    Seasonal allergies          Other History:  Height:   Ht Readings from Last 1 Encounters:   05/25/22 5' 2" (1 575 m)     Weight:   Wt Readings from Last 1 Encounters:   05/25/22 61 2 kg (135 lb)       Relevant Family History   Patient reports no Ashkenazi Restorationist ancestry  Maternal Family History: Mother (d 73) history of breast cancer (dx 45) per Leilani Welsh her mother's oncologist said that the features of her cancer were not highly suspicious for a genetic cause  Uncle (d 25) history of NHL (dx 20)  Grandfather (d 75) history of melanoma (dx 67) reportedly metastasized to his prostate  Betsy Rohan uncle with history of lung cancer    Paternal Family History:   No information    Please refer to the scanned pedigree in the Media Tab for a complete family history     *All history is reported as provided by the patient; records are not available for review, except where indicated  Assessment:  We discussed sporadic, familial and hereditary cancer  We also discussed the many factors that influence our risk for cancer such as age, environmental exposures, lifestyle choices and family history        We reviewed the indications suggestive of a hereditary predisposition to cancer  Although the personal and family history of cancer/tumors is not consistent with the typical presentation of a hereditary cancer syndrome, genetic testing may be considered to rule out moderately penetrant genes which have clear management recommendations  The risks, benefits, and limitations of genetic testing were reviewed with the patient, as well as genetic discrimination laws, and possible test results (positive, negative, variants of uncertain significance) and their clinical implications  If positive for a mutation, options for managing cancer risk including increased surveillance, chemoprevention, and in some cases prophylactic surgery were discussed  Brigitte Mcadams was informed that if a hereditary cancer syndrome was identified in her, first degree relatives (parents, siblings, and children) have a chance of also inheriting the condition  Genetic testing would allow for predictive genetic testing in other relatives, who may also be at risk depending on their degree of relation  Plan: Patient decided not to proceed with testing at this time  Summary:     Brigitte Mcadams and I had a lengthy conversation about the implications of genetic testing on her current screening and her children  At this time, Brigitte Mcadams does not feel that genetic testing would have much impact on her current screening, and would like to discuss with her children if this is information they feel ready for  I provided her with information on genetic testing and our contact information  Should she be interested in proceeding she can call our office

## 2022-09-07 NOTE — PROGRESS NOTES
Pre-Test Genetic Counseling Consult Note    Patient Name: Gabe Maloney   /Age: 1970/51 y o  Referring Provider: Nicole Liao MD    Date of Service: 2022  Genetic Counselor: Robert Carbajal MS, [facility]  Interpretation Services: None  [facility]      Allyson Cruz was referred to the [address] [place] [address] and Genetic Assessment Program due to her family history of of breast cancer  she presents today to discuss the possibility of a hereditary cancer syndrome, options for genetic testing, and implications for her and her family  Cancer History and Treatment:   Personal History: no personal history of cancer    Screening Hx:   Breast:  Breast Imagin22  Breast Density: Heterogeneously dense   mammos every 6mo in the past, will re-evaluateif this is still needed    Colon:  Colonoscopy: 21  FINDINGS:  · One polyp measuring smaller than 5 mm in the rectosigmoid; performed cold forceps biopsy  · Colon mucosa appeared otherwise normal throughout the entire examined colon  Final Diagnosis   A  Rectosigmoid colon polyp (cold biopsy):     - Polypoid colonic mucosa with minimal surface hyperplasia       Gynecologic:  Ovaries intact ANALILIA and BS on     Skin:  On waiting list for derm    Other screening: none    Reproductive History  Age at menarche: 14  Age at first live birth: 24  Menopause: Post Menopausal (45)  Hormone replacement: none    Medical and Surgical History  Pertinent surgical history:   Past Surgical History:   Procedure Laterality Date    APPENDECTOMY      BREAST CYST ASPIRATION Left 2021    CERVICAL POLYPECTOMY      HYSTERECTOMY  2021    VA LAP, SUPRACERVIAL HYSTERECTOMY W/ TUBE&OV, <250G N/A 2021    Procedure: L S H , B/L SALPINGECTOMY;  Surgeon: Onel Enriquez DO;  [facility]: [facility];  Service: Gynecology    UMBILICAL HERNIA REPAIR      US BREAST CYST ASPIRATION LEFT INITIAL Left 2021     BREAST CYST ASPIRATION RIGHT INITIAL Right 10/18/2019      Pertinent medical history:  Past Medical History:   Diagnosis Date    Abnormal Pap smear of cervix     ascus    Bilateral ovarian cysts     Fibrocystic breast     Fibroid     uterine  LSH w/ b/l salpingectomy today 2/22/2021    Migraine     in the past    Seasonal allergies          Other History:  Height:   Ht Readings from Last 1 Encounters:   05/25/22 5' 2" (1 575 m)     Weight:   Wt Readings from Last 1 Encounters:   05/25/22 61 2 kg (135 lb)       Relevant Family History   Patient reports no Ashkenazi Zoroastrianism ancestry  Maternal Family History: Mother (d 73) history of breast cancer (dx 46) per Natalie Broussard her mother's oncologist said that the features of her cancer were not highly suspicious for a genetic cause  Uncle (d 25) history of NHL (dx 20)  Grandfather (d 75) history of melanoma (dx 67) reportedly metastasized to his prostate  Besty Rohan uncle with history of lung cancer    Paternal Family History:   No information    Please refer to the scanned pedigree in the Media Tab for a complete family history     *All history is reported as provided by the patient; records are not available for review, except where indicated  Assessment:  We discussed sporadic, familial and hereditary cancer  We also discussed the many factors that influence our risk for cancer such as age, environmental exposures, lifestyle choices and family history  We reviewed the indications suggestive of a hereditary predisposition to cancer  Although the personal and family history of cancer/tumors is not consistent with the typical presentation of a hereditary cancer syndrome, genetic testing may be considered to rule out moderately penetrant genes which have clear management recommendations        The risks, benefits, and limitations of genetic testing were reviewed with the patient, as well as genetic discrimination laws, and possible test results (positive, negative, variants of uncertain significance) and their clinical implications  If positive for a mutation, options for managing cancer risk including increased surveillance, chemoprevention, and in some cases prophylactic surgery were discussed  Scott Sands was informed that if a hereditary cancer syndrome was identified in her, first degree relatives (parents, siblings, and children) have a chance of also inheriting the condition  Genetic testing would allow for predictive genetic testing in other relatives, who may also be at risk depending on their degree of relation  Plan: Patient decided not to proceed with testing at this time  Summary:     Scott Sands and I had a lengthy conversation about the implications of genetic testing on her current screening and her children  At this time, Scott Sands does not feel that genetic testing would have much impact on her current screening, and would like to discuss with her children if this is information they feel ready for  I provided her with information on genetic testing and our contact information  Should she be interested in proceeding she can call our office

## 2022-10-20 ENCOUNTER — HOSPITAL ENCOUNTER (OUTPATIENT)
Dept: ULTRASOUND IMAGING | Facility: CLINIC | Age: 52
Discharge: HOME | End: 2022-10-20
Payer: COMMERCIAL

## 2022-10-20 DIAGNOSIS — Z12.31 ENCOUNTER FOR SCREENING MAMMOGRAM FOR MALIGNANT NEOPLASM OF BREAST: ICD-10-CM

## 2022-10-20 PROCEDURE — 76641 ULTRASOUND BREAST COMPLETE: CPT

## 2022-12-06 LAB
25(OH)D3 SERPL-MCNC: 37 NG/ML (ref 30–100)
ALBUMIN SERPL-MCNC: 4.2 G/DL (ref 3.6–5.1)
ALBUMIN/GLOB SERPL: 1.9 (CALC) (ref 1–2.5)
ALP SERPL-CCNC: 60 U/L (ref 37–153)
ALT SERPL-CCNC: 10 U/L (ref 6–29)
AST SERPL-CCNC: 15 U/L (ref 10–35)
BILIRUB SERPL-MCNC: 0.4 MG/DL (ref 0.2–1.2)
BUN SERPL-MCNC: 12 MG/DL (ref 7–25)
BUN/CREAT SERPL: NORMAL (CALC) (ref 6–22)
CALCIUM SERPL-MCNC: 9.2 MG/DL (ref 8.6–10.4)
CHLORIDE SERPL-SCNC: 105 MMOL/L (ref 98–110)
CHOLEST SERPL-MCNC: 210 MG/DL
CHOLEST/HDLC SERPL: 3 (CALC)
CO2 SERPL-SCNC: 24 MMOL/L (ref 20–32)
CREAT SERPL-MCNC: 0.86 MG/DL (ref 0.5–1.03)
GFR/BSA.PRED SERPLBLD CYS-BASED-ARV: 81 ML/MIN/1.73M2
GLOBULIN SER CALC-MCNC: 2.2 G/DL (CALC) (ref 1.9–3.7)
GLUCOSE SERPL-MCNC: 92 MG/DL (ref 65–99)
HDLC SERPL-MCNC: 71 MG/DL
LDLC SERPL CALC-MCNC: 120 MG/DL (CALC)
NONHDLC SERPL-MCNC: 139 MG/DL (CALC)
POTASSIUM SERPL-SCNC: 4.1 MMOL/L (ref 3.5–5.3)
PROT SERPL-MCNC: 6.4 G/DL (ref 6.1–8.1)
SODIUM SERPL-SCNC: 138 MMOL/L (ref 135–146)
TRIGL SERPL-MCNC: 93 MG/DL
TSH SERPL-ACNC: 1.68 MIU/L

## 2022-12-16 ENCOUNTER — OFFICE VISIT (OUTPATIENT)
Dept: FAMILY MEDICINE CLINIC | Facility: CLINIC | Age: 52
End: 2022-12-16

## 2022-12-16 VITALS
WEIGHT: 143 LBS | HEIGHT: 62 IN | RESPIRATION RATE: 16 BRPM | DIASTOLIC BLOOD PRESSURE: 80 MMHG | OXYGEN SATURATION: 100 % | SYSTOLIC BLOOD PRESSURE: 120 MMHG | HEART RATE: 78 BPM | BODY MASS INDEX: 26.31 KG/M2

## 2022-12-16 DIAGNOSIS — Z00.00 ROUTINE GENERAL MEDICAL EXAMINATION AT A HEALTH CARE FACILITY: Primary | ICD-10-CM

## 2022-12-16 PROBLEM — R92.8 ABNORMAL MAMMOGRAM: Status: RESOLVED | Noted: 2017-02-15 | Resolved: 2022-12-16

## 2022-12-16 PROBLEM — D25.2 FIBROIDS, SUBSEROUS: Status: RESOLVED | Noted: 2017-02-08 | Resolved: 2022-12-16

## 2022-12-16 RX ORDER — BIOTIN 1 MG
TABLET ORAL
COMMUNITY
Start: 2022-06-01

## 2022-12-16 RX ORDER — SACCHAROMYCES BOULARDII 250 MG
250 CAPSULE ORAL 2 TIMES DAILY
COMMUNITY

## 2022-12-16 RX ORDER — VITAMIN B COMPLEX
TABLET ORAL
COMMUNITY
Start: 2022-01-01

## 2022-12-16 NOTE — PROGRESS NOTES
SUBJECTIVE:-------------------------------------------------------------------------------------------    Drew Antoine is a 45 y o   female and is here for routine health maintenance  Health Maintenance   Topic Date Due   • Hepatitis B Vaccine (1 of 3 - 3-dose series) Never done   • BMI: Followup Plan  Never done   • COVID-19 Vaccine (4 - Booster for Pfizer series) 04/16/2022   • Influenza Vaccine (1) Never done   • Annual Physical  12/07/2022   • HIV Screening  12/08/2023 (Originally 12/27/1985)   • Hepatitis C Screening  01/07/2024 (Originally 1970)   • Breast Cancer Screening: Mammogram  05/25/2023   • Depression Screening  12/16/2023   • BMI: Adult  12/16/2023   • Cervical Cancer Screening  10/26/2025   • Colorectal Cancer Screening  01/27/2028   • DTaP,Tdap,and Td Vaccines (2 - Td or Tdap) 12/07/2031   • Pneumococcal Vaccine: Pediatrics (0 to 5 Years) and At-Risk Patients (6 to 59 Years)  Aged Out   • HIB Vaccine  Aged Out   • IPV Vaccine  Aged Out   • Hepatitis A Vaccine  Aged Out   • Meningococcal ACWY Vaccine  Aged Out   • HPV Vaccine  Aged [employer] History   Administered Date(s) Administered   • COVID-19 PFIZER VACCINE 0 3 ML IM 04/07/2021, 05/02/2021, 12/16/2021   • Tdap 12/07/2021         Diet and Physical Activity  Diet: well balanced diet  Body mass index is 26 16 kg/m²  Exercise: frequently      General Health  Hearing:  Is normal  Vision: sees ophthalmologist/optometrist yearly  Dental:  Sees dentist every 6 months      Smoker no        ASSESSMENT/PLAN:-------------------------------------------------------------------------------------------    Patient's physical is up-to-date   Immunizations;  Shingrx in the next year  Will consider the flu shot  Cancer screenings are up-to-date      Patient instructed on exercise  Patient instructed on healthy choices for diet       NEXT PHYSICAL 1 YEAR          The following portions of the patient's history were reviewed and updated as appropriate: allergies, current medications, past family history, past medical history, past social history, past surgical history and problem list       OBJECTIVE:---------------------------------------------------------------------------------------------------    /80 (BP Location: Left arm, Patient Position: Sitting, Cuff Size: Standard)   Pulse 78   Resp 16   Ht 5' 2" (1 575 m)   Wt 64 9 kg (143 lb)   LMP 10/18/2020   SpO2 100%   BMI 26 16 kg/m²   Wt Readings from Last 3 Encounters:   12/16/22 64 9 kg (143 lb)   05/25/22 61 2 kg (135 lb)   12/08/21 61 2 kg (135 lb)     BP Readings from Last 3 Encounters:   12/16/22 120/80   12/08/21 120/72   12/07/21 122/80     Pulse Readings from Last 3 Encounters:   12/16/22 78   12/07/21 74   06/08/21 81     Body mass index is 26 16 kg/m²  Health Maintenance   Topic Date Due   • Hepatitis B Vaccine (1 of 3 - 3-dose series) Never done   • BMI: Followup Plan  Never done   • COVID-19 Vaccine (4 - Booster for Pfizer series) 04/16/2022   • Influenza Vaccine (1) Never done   • Annual Physical  12/07/2022   • HIV Screening  12/08/2023 (Originally 12/27/1985)   • Hepatitis C Screening  01/07/2024 (Originally 1970)   • Breast Cancer Screening: Mammogram  05/25/2023   • Depression Screening  12/16/2023   • BMI: Adult  12/16/2023   • Cervical Cancer Screening  10/26/2025   • Colorectal Cancer Screening  01/27/2028   • DTaP,Tdap,and Td Vaccines (2 - Td or Tdap) 12/07/2031   • Pneumococcal Vaccine: Pediatrics (0 to 5 Years) and At-Risk Patients (6 to 59 Years)  Aged Out   • HIB Vaccine  Aged Out   • IPV Vaccine  Aged Out   • Hepatitis A Vaccine  Aged Out   • Meningococcal ACWY Vaccine  Aged Out   • HPV Vaccine  Aged Out       ROS:   12 point review of systems negative            PHYSICAL EXAM:    Gen    No acute distress well-appearing well-nourished appears stated age    Mental status  Good judgment and insight oriented to time person and place, recent and remote memory intact mood and affect normal cooperative and patient is reasonable    HEENT  PERRLA 3 mm, EOMI without nystagmus, TMs clear, turbinates open pink no exudate, pharynx benign, tongue midline    Neck   supple no masses trachea midline positive click normal carotid upstrokes with no bruits    Cor  Regular rhythm without ectopy or murmur, no S3-S4, normal palpation that is no heave lift or thrill    Vascular  No edema, good pedal pulses    Lungs  CTA bilaterally in no respiratory distress no wheezes rhonchi or rales, normal to palpation no tactile fremitus    Abdomen  Soft, no palpable masses, no hepatosplenomegaly, normal bowel sounds, nontender    Lymphatics  No palpable nodes in the neck, supraclavicular area, axilla, or groin     Musculoskeletal  No clubbing cyanosis or edema muscle tone normal 12 point review of systems is negative    Skin  no rashes or abnormal appearing lesions    Neuro  Normal ambulation, cranial nerves 2-12 grossly intact, higher functioning with reasoning intact

## 2022-12-16 NOTE — PATIENT INSTRUCTIONS
1   Everything looks really good keep up the good work    2    Please make an appointment for you to shingles shots after the new year 2 months apart  Please consider getting your flu shot    You back in a year or sooner if needed  Call ahead of time so we can get your blood work

## 2023-02-02 ENCOUNTER — CLINICAL SUPPORT (OUTPATIENT)
Dept: FAMILY MEDICINE CLINIC | Facility: CLINIC | Age: 53
End: 2023-02-02

## 2023-02-02 DIAGNOSIS — Z23 NEED FOR SHINGLES VACCINE: Primary | ICD-10-CM

## 2023-02-13 ENCOUNTER — ANNUAL EXAM (OUTPATIENT)
Dept: OBGYN CLINIC | Facility: CLINIC | Age: 53
End: 2023-02-13

## 2023-02-13 VITALS
WEIGHT: 145 LBS | HEIGHT: 62 IN | SYSTOLIC BLOOD PRESSURE: 122 MMHG | DIASTOLIC BLOOD PRESSURE: 84 MMHG | BODY MASS INDEX: 26.68 KG/M2

## 2023-02-13 DIAGNOSIS — Z01.419 ENCOUNTER FOR WELL WOMAN EXAM WITH ROUTINE GYNECOLOGICAL EXAM: Primary | ICD-10-CM

## 2023-02-13 DIAGNOSIS — R92.2 DENSE BREASTS: ICD-10-CM

## 2023-02-13 DIAGNOSIS — Z12.31 ENCOUNTER FOR SCREENING MAMMOGRAM FOR MALIGNANT NEOPLASM OF BREAST: ICD-10-CM

## 2023-02-13 NOTE — PROGRESS NOTES
ASSESSMENT & PLAN:   Diagnoses and all orders for this visit:    Encounter for well woman exam with routine gynecological exam    Encounter for screening mammogram for malignant neoplasm of breast  -     Mammo screening bilateral w 3d & cad; Future  -     US breast screening bilateral complete (ABUS); Future    Dense breasts  -     US breast screening bilateral complete (ABUS); Future          The following were reviewed in today's visit: ASCCP guidelines, Gardisil vaccination, STD testing breast self exam, mammography screening ordered, menopause, exercise, healthy diet and colonoscopy reviewed  Patient to return to office in yearly for annual exam      All questions have been answered to her satisfaction  CC:  Annual Gynecologic Examination  Chief Complaint   Patient presents with   • Gynecologic Exam     Pt is here for her yearly exam mammo ordered pap utd  Pt doing well no concerns at this time  HPI: Whit Oliva is a 47 y o  [id number] who presents for annual gynecologic examination  She has the following concerns:  none      Health Maintenance:    Exercise: frequently  Breast exams/breast awareness: yes  Diet: well balanced diet  Last mammogram:  - BIRADS 1     - TC lifetime risk 15 6%   ABUS ordered with yearly screening  Colorectal cancer screenin - repeat duet       Past Medical History:   Diagnosis Date   • Abnormal Pap smear of cervix     ascus   • Bilateral ovarian cysts    • Fibrocystic breast    • Fibroid     uterine  LSH w/ b/l salpingectomy today 2021   • Migraine     in the past   • Seasonal allergies        Past Surgical History:   Procedure Laterality Date   • APPENDECTOMY     • BREAST CYST ASPIRATION Left 2021   • CERVICAL POLYPECTOMY     • HYSTERECTOMY  2021    cervix intact   • VT LAPS SUPRACRV HYSTERECT 250 GM/< RMVL TUBE/OVAR N/A 2021    Procedure: L S H , B/L SALPINGECTOMY;  Surgeon: Yo Valera DO;  Location: [facility];  Service: Gynecology   • UMBILICAL HERNIA REPAIR     • US BREAST CYST ASPIRATION LEFT INITIAL Left 06/08/2021   • US BREAST CYST ASPIRATION RIGHT INITIAL Right 10/18/2019       Past OB/Gyn History:   Patient's last menstrual period was 10/18/2020  Menopausal status: postmenopausal  Menopausal symptoms: occasional hot flashes  Improving    Last Pap: 2020 : no abnormalities  History of abnormal Pap smear: no    Patient is currently sexually active  STD testing: no  Current contraception: status post hysterectomy      Family History  Family History   Problem Relation Age of Onset   • Heart disease Mother    • Coronary artery disease Mother    • Hypothyroidism Mother    • Breast cancer Mother 46   • Heart disease Father    • Coronary artery disease Father    • Thyroid disease Maternal Grandmother    • Diabetes Maternal Grandmother    • No Known Problems Maternal Grandfather    • No Known Problems Sister    • No Known Problems Daughter    • No Known Problems Paternal Grandmother    • Melanoma Paternal Grandfather    • No Known Problems Son    • Alcohol abuse Neg Hx    • Substance Abuse Neg Hx    • Mental illness Neg Hx        Family history of uterine or ovarian cancer: no  Family history of breast cancer: yes  Family history of colon cancer: no    Social History:  Social History     Socioeconomic History   • Marital status: /Civil Union     Spouse name: Not on file   • Number of children: Not on file   • Years of education: Not on file   • Highest education level: Not on file   Occupational History   • Not on file   Tobacco Use   • Smoking status: Never   • Smokeless tobacco: Never   Vaping Use   • Vaping Use: Never used   Substance and Sexual Activity   • Alcohol use:  Yes     Alcohol/week: 1 0 standard drink     Types: 1 Glasses of wine per week     Comment: social   • Drug use: Never   • Sexual activity: Yes     Partners: Male     Birth control/protection: Male Sterilization     Comment: hysterectomy   Other Topics Concern   • Not on file   Social History Narrative    Exercises regularly     Social Determinants of Health     Financial Resource Strain: Not on file   Food Insecurity: Not on file   Transportation Needs: Not on file   Physical Activity: Not on file   Stress: Not on file   Social Connections: Not on file   Intimate Partner Violence: Not on file   Housing Stability: Not on file     Domestic violence screen: negative    Allergies: Allergies   Allergen Reactions   • Other Allergic Rhinitis     Seasonal        Medications:    Current Outpatient Medications:   •  aspirin 81 MG tablet, Take by mouth, Disp: , Rfl:   •  Biotin 1000 MCG tablet, , Disp: , Rfl:   •  cholecalciferol (VITAMIN D3) 25 mcg (1,000 units) tablet, , Disp: , Rfl:   •  loratadine (CLARITIN) 10 mg tablet, Take 10 mg by mouth as needed , Disp: , Rfl:   •  multivitamin (THERAGRAN) TABS, Take 1 tablet by mouth daily, Disp: , Rfl:   •  saccharomyces boulardii (FLORASTOR) 250 mg capsule, Take 250 mg by mouth 2 (two) times a day, Disp: , Rfl:   •  vitamin E 100 UNIT capsule, Take 100 Units by mouth daily, Disp: , Rfl:     Review of Systems:  Review of Systems   Constitutional: Negative for activity change, appetite change and unexpected weight change  Respiratory: Negative for cough and shortness of breath  Cardiovascular: Negative for chest pain  Gastrointestinal: Negative for abdominal pain, constipation, diarrhea, nausea and vomiting  Genitourinary: Negative for difficulty urinating, dyspareunia, frequency, menstrual problem, pelvic pain, urgency, vaginal bleeding, vaginal discharge and vaginal pain  Musculoskeletal: Negative for back pain  Skin: Negative  Neurological: Negative for dizziness, weakness, light-headedness and headaches  Psychiatric/Behavioral: Negative            Physical Exam:  /84 (BP Location: Right arm, Patient Position: Sitting, Cuff Size: Standard)   Ht 5' 2" (1 575 m)   Wt 65 8 kg (145 lb)   LMP 10/18/2020 BMI 26 52 kg/m²    Physical Exam  Constitutional:       General: She is not in acute distress  Appearance: Normal appearance  She is well-developed and overweight  She is not diaphoretic  Genitourinary:      Vulva and bladder normal       No lesions in the vagina  Genitourinary Comments: Perineum normal in appearance, no lacerations, no ulcerations, no lesions visualized  Right Labia: No rash, tenderness or lesions  Left Labia: No tenderness, lesions or rash  No inguinal adenopathy present in the right or left side  No vaginal discharge, erythema, tenderness or bleeding  No vaginal prolapse present  No vaginal atrophy present  Right Adnexa: not tender, not full and no mass present  Left Adnexa: not tender, not full and no mass present  Cervix is nulliparous  No cervical motion tenderness, discharge, friability, lesion or polyp  No parametrium nodularity or thickening present  Uterus exam comments: Status post supracervical hysterectomy  Uterus is absent  No urethral prolapse or mass present  Bladder is not tender  Pelvic exam was performed with patient in the lithotomy position  Rectum:      No tenderness or external hemorrhoid  Breasts:     Breasts are symmetrical       Right: No swelling, bleeding, mass, skin change or tenderness  Left: No swelling, bleeding, mass, skin change or tenderness  HENT:      Head: Normocephalic and atraumatic  Neck:      Thyroid: No thyromegaly or thyroid tenderness  Cardiovascular:      Rate and Rhythm: Normal rate and regular rhythm  Heart sounds: Normal heart sounds  No murmur heard  No friction rub  Pulmonary:      Effort: Pulmonary effort is normal  No respiratory distress  Breath sounds: Normal breath sounds  No wheezing or rales  Abdominal:      Palpations: Abdomen is soft  There is no mass  Tenderness: There is no abdominal tenderness   There is no guarding  Musculoskeletal:         General: No tenderness  Normal range of motion  Right lower leg: No edema  Left lower leg: No edema  Lymphadenopathy:      Lower Body: No right inguinal adenopathy  No left inguinal adenopathy  Neurological:      Mental Status: She is alert and oriented to person, place, and time  Skin:     General: Skin is warm and dry  Coloration: Skin is not pale  Findings: No erythema  Psychiatric:         Mood and Affect: Mood normal          Behavior: Behavior normal          Thought Content: Thought content normal          Judgment: Judgment normal    Vitals and nursing note reviewed

## 2023-08-09 ENCOUNTER — HOSPITAL ENCOUNTER (OUTPATIENT)
Dept: MAMMOGRAPHY | Facility: HOSPITAL | Age: 53
Discharge: HOME | End: 2023-08-09
Payer: COMMERCIAL

## 2023-08-09 VITALS — BODY MASS INDEX: 26.68 KG/M2 | WEIGHT: 145 LBS | HEIGHT: 62 IN

## 2023-08-09 DIAGNOSIS — Z12.31 ENCOUNTER FOR SCREENING MAMMOGRAM FOR MALIGNANT NEOPLASM OF BREAST: ICD-10-CM

## 2023-08-09 PROCEDURE — 77067 SCR MAMMO BI INCL CAD: CPT

## 2023-08-09 PROCEDURE — 77063 BREAST TOMOSYNTHESIS BI: CPT

## 2023-12-12 ENCOUNTER — RA CDI HCC (OUTPATIENT)
Dept: OTHER | Facility: HOSPITAL | Age: 53
End: 2023-12-12

## 2023-12-12 NOTE — PROGRESS NOTES
[address] [facility] coding opportunities       Chart reviewed, no opportunity found: CHART REVIEWED, NO OPPORTUNITY FOUND        Patients Insurance        Commercial Insurance: Gregorio Castro

## 2023-12-19 ENCOUNTER — OFFICE VISIT (OUTPATIENT)
Dept: FAMILY MEDICINE CLINIC | Facility: CLINIC | Age: 53
End: 2023-12-19
Payer: COMMERCIAL

## 2023-12-19 VITALS
WEIGHT: 147.2 LBS | HEIGHT: 62 IN | BODY MASS INDEX: 27.09 KG/M2 | RESPIRATION RATE: 16 BRPM | DIASTOLIC BLOOD PRESSURE: 82 MMHG | HEART RATE: 77 BPM | SYSTOLIC BLOOD PRESSURE: 120 MMHG

## 2023-12-19 DIAGNOSIS — Z23 ENCOUNTER FOR IMMUNIZATION: ICD-10-CM

## 2023-12-19 DIAGNOSIS — Z00.00 ROUTINE GENERAL MEDICAL EXAMINATION AT A HEALTH CARE FACILITY: Primary | ICD-10-CM

## 2023-12-19 PROCEDURE — 90471 IMMUNIZATION ADMIN: CPT

## 2023-12-19 PROCEDURE — 99396 PREV VISIT EST AGE 40-64: CPT | Performed by: FAMILY MEDICINE

## 2023-12-19 PROCEDURE — 90686 IIV4 VACC NO PRSV 0.5 ML IM: CPT

## 2023-12-19 NOTE — PROGRESS NOTES
SUBJECTIVE:-------------------------------------------------------------------------------------------  Patient is here for a well exam.      Depression Screening and Follow-up Plan: Patient was screened for depression during today's encounter. They screened negative with a PHQ-2 score of 0.      Davina Taylor is a 52 y.o.  female and is here for routine health maintenance.     Health Maintenance   Topic Date Due    BMI: Followup Plan  Never done    Influenza Vaccine (1) Never done    COVID-19 Vaccine (4 - 2023-24 season) 09/01/2023    Annual Physical  12/16/2023    Hepatitis C Screening  01/07/2024 (Originally 1970)    HIV Screening  12/19/2025 (Originally 12/27/1985)    BMI: Adult  08/09/2024    Breast Cancer Screening: Mammogram  08/09/2024    Depression Screening  12/19/2024    Colorectal Cancer Screening  01/27/2028    DTaP,Tdap,and Td Vaccines (2 - Td or Tdap) 12/07/2031    Pneumococcal Vaccine: Pediatrics (0 to 5 Years) and At-Risk Patients (6 to 64 Years)  Aged Out    HIB Vaccine  Aged Out    IPV Vaccine  Aged Out    Hepatitis A Vaccine  Aged Out    Meningococcal ACWY Vaccine  Aged Out    HPV Vaccine  Aged Out     Immunization History   Administered Date(s) Administered    COVID-19 PFIZER VACCINE 0.3 ML IM 04/07/2021, 05/02/2021, 12/16/2021    Tdap 12/07/2021    Zoster Vaccine Recombinant 02/02/2023, 04/20/2023         Diet and Physical Activity  Diet: well balanced diet  Body mass index is 26.71 kg/m².  Exercise: frequently      General Health  Hearing:  Is normal  Vision: sees ophthalmologist/optometrist  q2 yr  Dental:  Sees dentist every 6 months      Smoker no        ASSESSMENT/PLAN:-------------------------------------------------------------------------------------------    Patient's physical is up-to-date   Immunizations are up-to-date  Cancer screenings are up-to-date  Colon 1/2029      Patient instructed on exercise  Patient instructed on healthy choices for diet       NEXT PHYSICAL 1  "YEAR          The following portions of the patient's history were reviewed and updated as appropriate: allergies, current medications, past family history, past medical history, past social history, past surgical history and problem list.      OBJECTIVE:---------------------------------------------------------------------------------------------------    /82   Pulse 77   Resp 16   Ht 5' 2.25\" (1.581 m)   Wt 66.8 kg (147 lb 3.2 oz)   LMP 10/18/2020   BMI 26.71 kg/m²   Wt Readings from Last 3 Encounters:   12/19/23 66.8 kg (147 lb 3.2 oz)   08/09/23 65.8 kg (145 lb)   02/13/23 65.8 kg (145 lb)     BP Readings from Last 3 Encounters:   12/19/23 120/82   02/13/23 122/84   12/16/22 120/80     Pulse Readings from Last 3 Encounters:   12/19/23 77   12/16/22 78   12/07/21 74     Body mass index is 26.71 kg/m².  Health Maintenance   Topic Date Due    BMI: Followup Plan  Never done    Influenza Vaccine (1) Never done    COVID-19 Vaccine (4 - 2023-24 season) 09/01/2023    Annual Physical  12/16/2023    Hepatitis C Screening  01/07/2024 (Originally 1970)    HIV Screening  12/19/2025 (Originally 12/27/1985)    BMI: Adult  08/09/2024    Breast Cancer Screening: Mammogram  08/09/2024    Depression Screening  12/19/2024    Colorectal Cancer Screening  01/27/2028    DTaP,Tdap,and Td Vaccines (2 - Td or Tdap) 12/07/2031    Pneumococcal Vaccine: Pediatrics (0 to 5 Years) and At-Risk Patients (6 to 64 Years)  Aged Out    HIB Vaccine  Aged Out    IPV Vaccine  Aged Out    Hepatitis A Vaccine  Aged Out    Meningococcal ACWY Vaccine  Aged Out    HPV Vaccine  Aged Out       ROS:   12 point review of systems negative            PHYSICAL EXAM:    Gen.  No acute distress well-appearing well-nourished appears stated age    Mental status  Good judgment and insight oriented to time person and place, recent and remote memory intact mood and affect normal cooperative and patient is reasonable    HEENT  STEFANIA 3 mm, EOMI without " nystagmus, TMs clear, turbinates open pink no exudate, pharynx benign, tongue midline    Neck   supple no masses trachea midline positive click normal carotid upstrokes with no bruits    Cor  Regular rhythm without ectopy or murmur, no S3-S4, normal palpation that is no heave lift or thrill    Vascular  No edema, good pedal pulses    Lungs  CTA bilaterally in no respiratory distress no wheezes rhonchi or rales, normal to palpation no tactile fremitus    Abdomen  Soft, no palpable masses, no hepatosplenomegaly, normal bowel sounds, nontender    Lymphatics  No palpable nodes in the neck, supraclavicular area, axilla, or groin     Musculoskeletal  No clubbing cyanosis or edema muscle tone normal 12 point review of systems is negative    Skin  no rashes or abnormal appearing lesions    Neuro  Normal ambulation, cranial nerves 2-12 grossly intact, higher functioning with reasoning intact.

## 2023-12-19 NOTE — PATIENT INSTRUCTIONS
Everything looks excellent keep up the good work    Increase your aerobic exercise to to about a total of 3 hours weekly    We do not need any blood work this year    See you back in about a year or whenever you need us

## 2024-02-14 ENCOUNTER — ANNUAL EXAM (OUTPATIENT)
Dept: OBGYN CLINIC | Facility: CLINIC | Age: 54
End: 2024-02-14
Payer: COMMERCIAL

## 2024-02-14 VITALS — WEIGHT: 145 LBS | SYSTOLIC BLOOD PRESSURE: 122 MMHG | BODY MASS INDEX: 26.31 KG/M2 | DIASTOLIC BLOOD PRESSURE: 80 MMHG

## 2024-02-14 DIAGNOSIS — Z12.31 ENCOUNTER FOR SCREENING MAMMOGRAM FOR MALIGNANT NEOPLASM OF BREAST: ICD-10-CM

## 2024-02-14 DIAGNOSIS — Z11.51 SCREENING FOR HPV (HUMAN PAPILLOMAVIRUS): ICD-10-CM

## 2024-02-14 DIAGNOSIS — Z01.419 WELL WOMAN EXAM WITH ROUTINE GYNECOLOGICAL EXAM: Primary | ICD-10-CM

## 2024-02-14 DIAGNOSIS — Z12.4 ENCOUNTER FOR SCREENING FOR MALIGNANT NEOPLASM OF CERVIX: ICD-10-CM

## 2024-02-14 PROCEDURE — G0145 SCR C/V CYTO,THINLAYER,RESCR: HCPCS | Performed by: OBSTETRICS & GYNECOLOGY

## 2024-02-14 PROCEDURE — G0476 HPV COMBO ASSAY CA SCREEN: HCPCS | Performed by: OBSTETRICS & GYNECOLOGY

## 2024-02-14 PROCEDURE — S0612 ANNUAL GYNECOLOGICAL EXAMINA: HCPCS | Performed by: OBSTETRICS & GYNECOLOGY

## 2024-02-16 NOTE — PROGRESS NOTES
ASSESSMENT & PLAN:   Diagnoses and all orders for this visit:    Well woman exam with routine gynecological exam  -     Liquid-based pap, screening  -     HPV High Risk    Encounter for screening for malignant neoplasm of cervix  -     Liquid-based pap, screening  -     HPV High Risk    Screening for HPV (human papillomavirus)  -     Liquid-based pap, screening  -     HPV High Risk    Encounter for screening mammogram for malignant neoplasm of breast  -     Mammo screening bilateral w 3d & cad; Future          The following were reviewed in today's visit: ASCCP guidelines, Gardisil vaccination, STD testing breast self exam, mammography screening ordered, menopause, exercise, healthy diet, and colonoscopy reviewed.    Patient to return to office in yearly for annual exam.     All questions have been answered to her satisfaction.        CC:  Annual Gynecologic Examination  Chief Complaint   Patient presents with    Gynecologic Exam     Annual exam, pap indicated. Pt is well, states there are no gyn concerns.   2017 NILM, neg HPV  Pap 10/26/2020 neg pap/neg hpv   Mammo 22          HPI: Davina Taylor is a 53 y.o.  who presents for annual gynecologic examination.  She has the following concerns:  none.     Health Maintenance:    Exercise: frequently  Breast exams/breast awareness: yes  Last mammogram:  - BIRADS 2  Colorectal cancer screenin - repeat in 7 years, due     Past Medical History:   Diagnosis Date    Abnormal Pap smear of cervix     ascus    Anemia     At times I have been on a  slow release iron supplement    Bilateral ovarian cysts     Fibrocystic breast     Fibroid     uterine  LSH w/ b/l salpingectomy today 2021    Migraine     in the past    Seasonal allergies        Past Surgical History:   Procedure Laterality Date    APPENDECTOMY      BREAST CYST ASPIRATION Left 2021    CERVICAL POLYPECTOMY      HYSTERECTOMY  2021    cervix intact    CA LAPS SUPRACRV HYSTERECT  250 GM/< RMVL TUBE/OVAR N/A 02/22/2021    Procedure: L.S.H., B/L SALPINGECTOMY;  Surgeon: Fred Arellano DO;  Location: [facility];  Service: Gynecology    UMBILICAL HERNIA REPAIR      US BREAST CYST ASPIRATION LEFT INITIAL Left 06/08/2021     BREAST CYST ASPIRATION RIGHT INITIAL Right 10/18/2019       Past OB/Gyn History:   Patient's last menstrual period was 10/18/2020.    Menopausal status: postmenopausal  Menopausal symptoms: None - very rare hot flashes    Last Pap: 2020 : no abnormalities  History of abnormal Pap smear: no    Patient is currently sexually active.   STD testing: no  Current contraception: status post hysterectomy      Family History  Family History   Problem Relation Age of Onset    Heart disease Mother     Coronary artery disease Mother     Hypothyroidism Mother     Breast cancer Mother 52    Thyroid disease Mother     Heart disease Father     Coronary artery disease Father     Thyroid disease Maternal Grandmother     Diabetes Maternal Grandmother     No Known Problems Maternal Grandfather     No Known Problems Sister     No Known Problems Daughter     No Known Problems Paternal Grandmother     Melanoma Paternal Grandfather     No Known Problems Son     Alcohol abuse Neg Hx     Substance Abuse Neg Hx     Mental illness Neg Hx        Family history of uterine or ovarian cancer: no  Family history of breast cancer: yes  Family history of colon cancer: no    Social History:  Social History     Socioeconomic History    Marital status: /Civil Union     Spouse name: Not on file    Number of children: Not on file    Years of education: Not on file    Highest education level: Not on file   Occupational History    Not on file   Tobacco Use    Smoking status: Never    Smokeless tobacco: Never   Vaping Use    Vaping status: Never Used   Substance and Sexual Activity    Alcohol use: Yes     Alcohol/week: 3.0 standard drinks of alcohol     Types: 3 Glasses of wine per week     Comment: social     Drug use: Never    Sexual activity: Yes     Partners: Male     Birth control/protection: Male Sterilization     Comment: hysterectomy   Other Topics Concern    Not on file   Social History Narrative    Exercises regularly     Social Determinants of Health     Financial Resource Strain: Not on file   Food Insecurity: Not on file   Transportation Needs: Not on file   Physical Activity: Not on file   Stress: Not on file   Social Connections: Not on file   Intimate Partner Violence: Not on file   Housing Stability: Not on file     Domestic violence screen: negative    Allergies:  Allergies   Allergen Reactions    Other Allergic Rhinitis     Seasonal        Medications:    Current Outpatient Medications:     aspirin 81 MG tablet, Take by mouth, Disp: , Rfl:     Biotin 1000 MCG tablet, , Disp: , Rfl:     cholecalciferol (VITAMIN D3) 25 mcg (1,000 units) tablet, , Disp: , Rfl:     loratadine (CLARITIN) 10 mg tablet, Take 10 mg by mouth as needed , Disp: , Rfl:     multivitamin (THERAGRAN) TABS, Take 1 tablet by mouth daily, Disp: , Rfl:     saccharomyces boulardii (FLORASTOR) 250 mg capsule, Take 250 mg by mouth 2 (two) times a day, Disp: , Rfl:     vitamin E 100 UNIT capsule, Take 100 Units by mouth daily, Disp: , Rfl:     Review of Systems:  Review of Systems   Constitutional:  Negative for activity change, appetite change and unexpected weight change.   Respiratory:  Negative for cough and shortness of breath.    Cardiovascular:  Negative for chest pain.   Gastrointestinal:  Negative for abdominal pain, constipation, diarrhea, nausea and vomiting.   Genitourinary:  Negative for difficulty urinating, dyspareunia, frequency, menstrual problem, pelvic pain, urgency, vaginal bleeding, vaginal discharge and vaginal pain.   Musculoskeletal:  Negative for back pain.   Skin: Negative.    Neurological:  Negative for dizziness, weakness, light-headedness and headaches.   Psychiatric/Behavioral: Negative.           Physical  Exam:  /80 (BP Location: Left arm, Patient Position: Sitting, Cuff Size: Standard)   Wt 65.8 kg (145 lb)   LMP 10/18/2020   BMI 26.31 kg/m²    Physical Exam  Constitutional:       General: She is not in acute distress.     Appearance: Normal appearance. She is well-developed and normal weight. She is not diaphoretic.   Genitourinary:      Vulva and bladder normal.      No lesions in the vagina.      Genitourinary Comments: Perineum normal in appearance, no lacerations, no ulcerations, no lesions visualized.      Right Labia: No rash, tenderness or lesions.     Left Labia: No tenderness, lesions or rash.     No inguinal adenopathy present in the right or left side.     No vaginal discharge, erythema, tenderness or bleeding.      No vaginal prolapse present.     No vaginal atrophy present.       Right Adnexa: not tender, not full and no mass present.     Left Adnexa: not tender, not full and no mass present.     Cervix is nulliparous.      No cervical motion tenderness, discharge, friability, lesion or polyp.      No parametrium nodularity or thickening present.     Uterus is absent.      No urethral prolapse or mass present.      Bladder is not tender.       Pelvic exam was performed with patient in the lithotomy position.   Rectum:      No tenderness or external hemorrhoid.   Breasts:     Breasts are symmetrical.      Right: No swelling, bleeding, mass, skin change or tenderness.      Left: No swelling, bleeding, mass, skin change or tenderness.   HENT:      Head: Normocephalic and atraumatic.   Neck:      Thyroid: No thyromegaly or thyroid tenderness.   Cardiovascular:      Rate and Rhythm: Normal rate and regular rhythm.      Heart sounds: Normal heart sounds. No murmur heard.     No friction rub.   Pulmonary:      Effort: Pulmonary effort is normal. No respiratory distress.      Breath sounds: Normal breath sounds. No wheezing or rales.   Abdominal:      Palpations: Abdomen is soft. There is no mass.       Tenderness: There is no abdominal tenderness. There is no guarding.   Musculoskeletal:         General: No tenderness. Normal range of motion.      Right lower leg: No edema.      Left lower leg: No edema.   Lymphadenopathy:      Lower Body: No right inguinal adenopathy. No left inguinal adenopathy.   Neurological:      Mental Status: She is alert and oriented to person, place, and time.   Skin:     General: Skin is warm and dry.      Coloration: Skin is not pale.      Findings: No erythema.   Psychiatric:         Mood and Affect: Mood normal.         Behavior: Behavior normal.         Thought Content: Thought content normal.         Judgment: Judgment normal.   Vitals and nursing note reviewed.

## 2024-02-19 LAB
LAB AP GYN PRIMARY INTERPRETATION: NORMAL
Lab: NORMAL

## 2024-12-03 ENCOUNTER — HOSPITAL ENCOUNTER (OUTPATIENT)
Dept: MAMMOGRAPHY | Facility: HOSPITAL | Age: 54
Discharge: HOME | End: 2024-12-03
Attending: OBSTETRICS & GYNECOLOGY
Payer: COMMERCIAL

## 2024-12-03 VITALS — HEIGHT: 62 IN | WEIGHT: 140 LBS | BODY MASS INDEX: 25.76 KG/M2

## 2024-12-03 DIAGNOSIS — Z12.31 ENCOUNTER FOR SCREENING MAMMOGRAM FOR MALIGNANT NEOPLASM OF BREAST: ICD-10-CM

## 2024-12-03 PROCEDURE — 77067 SCR MAMMO BI INCL CAD: CPT

## 2024-12-03 PROCEDURE — 77063 BREAST TOMOSYNTHESIS BI: CPT

## 2024-12-06 ENCOUNTER — RESULTS FOLLOW-UP (OUTPATIENT)
Dept: OBGYN CLINIC | Facility: CLINIC | Age: 54
End: 2024-12-06

## 2025-01-31 ENCOUNTER — RA CDI HCC (OUTPATIENT)
Dept: OTHER | Facility: HOSPITAL | Age: 55
End: 2025-01-31

## 2025-01-31 NOTE — PROGRESS NOTES
HCC coding opportunities       Chart reviewed, no opportunity found: CHART REVIEWED, NO OPPORTUNITY FOUND   This is a reminder to address (resolve/update/assess) ALL HCC (risk adjustment) codes as found on active problem list for 2025 as patient scores reset to zero XU     Patients Insurance        Commercial Insurance: [employer]

## 2025-05-20 ENCOUNTER — OFFICE VISIT (OUTPATIENT)
Dept: FAMILY MEDICINE CLINIC | Facility: CLINIC | Age: 55
End: 2025-05-20
Payer: COMMERCIAL

## 2025-05-20 VITALS
SYSTOLIC BLOOD PRESSURE: 110 MMHG | BODY MASS INDEX: 25.54 KG/M2 | WEIGHT: 138.8 LBS | HEIGHT: 62 IN | TEMPERATURE: 97.9 F | OXYGEN SATURATION: 98 % | DIASTOLIC BLOOD PRESSURE: 80 MMHG | RESPIRATION RATE: 16 BRPM | HEART RATE: 76 BPM

## 2025-05-20 DIAGNOSIS — Z13.6 SCREENING FOR CARDIOVASCULAR CONDITION: ICD-10-CM

## 2025-05-20 DIAGNOSIS — Z00.00 ANNUAL PHYSICAL EXAM: Primary | ICD-10-CM

## 2025-05-20 DIAGNOSIS — E78.2 MIXED HYPERLIPIDEMIA: ICD-10-CM

## 2025-05-20 DIAGNOSIS — Z79.899 ENCOUNTER FOR LONG-TERM (CURRENT) USE OF MEDICATIONS: ICD-10-CM

## 2025-05-20 DIAGNOSIS — E55.9 VITAMIN D DEFICIENCY: ICD-10-CM

## 2025-05-20 PROCEDURE — 99396 PREV VISIT EST AGE 40-64: CPT | Performed by: FAMILY MEDICINE

## 2025-05-20 RX ORDER — CAL/D3/MAG11/ZINC/COP/MANG/BOR 600 MG-800
1 TABLET ORAL
Start: 2025-05-20

## 2025-05-20 NOTE — PATIENT INSTRUCTIONS
Check your calcium and it should be around 600 mg  And if that is the case and take one with a meal or 3 times a day for the perfect amount    These have fasting blood work and urine done and [facility] the [facility]  If there is something we need to communicate with you regarding your blood work, we will    Consider this CT coronary score for $99  Of course we will only call if you get it done and this is to see if there is any calcifications in your arteries that might indirectly tell us about your cholesterol in your arteries    Mammogram is due December 2025  Colonoscopy due January 2028

## 2025-05-20 NOTE — PROGRESS NOTES
Adult Annual Physical  Name: Davina Taylor      : 1970      MRN: [id number]  Encounter Provider: Shaista Tanner DO  Encounter Date: 2025   Encounter department: [facility]    :  Check your calcium and it should be around 600 mg  And if that is the case and take one with a meal or 3 times a day for the perfect amount    These have fasting blood work and urine done and you will see the results in MyChart  If there is something we need to communicate with you regarding your blood work, we will    Consider this CT coronary score for $99  Of course we will only call if you get it done and this is to see if there is any calcifications in your arteries that might indirectly tell us about your cholesterol in your arteries  Assessment & Plan  Annual physical exam    Orders:    Calcium Carbonate-Vit D-Min (Caltrate 600+D Plus Minerals) 600-800 MG-UNIT TABS; Take 1 tablet by mouth 3 (three) times a day with meals    Mixed hyperlipidemia    Orders:    Lipid panel; Future    CT coronary calcium score; Future    Vitamin D deficiency    Orders:    Vitamin D 25 hydroxy; Future    Encounter for long-term (current) use of medications    Orders:    Comprehensive metabolic panel; Future    CBC and differential; Future    TSH, 3rd generation with Free T4 reflex; Future    UA (URINE) with reflex to Scope; Future    Screening for cardiovascular condition    Orders:    CT coronary calcium score; Future        Preventive Screenings:  - Diabetes Screening: orders placed  - Cholesterol Screening: orders placed   - Hepatitis C screening: screening not indicated   - HIV screening: screening not indicated   - Cervical cancer screening: screening up-to-date   - Breast cancer screening: screening up-to-date   - Colon cancer screening: screening up-to-date   - Lung cancer screening: screening not indicated     Counseling/Anticipatory Guidance:    - Dental health: discussed importance of regular  "tooth brushing, flossing, and dental visits.   - Exercise: the importance of regular exercise/physical activity was discussed. Recommend exercise 3-5 times per week for at least 30 minutes.       Depression Screening and Follow-up Plan: Patient was screened for depression during today's encounter. They screened negative with a PHQ-2 score of 0.          History of Present Illness     Adult Annual Physical:  Patient presents for annual physical.     Diet and Physical Activity:  - Diet/Nutrition: well balanced diet.  - Exercise: walking and 5-7 times a week on average. [place]    Depression Screening:  - PHQ-2 Score: 0    General Health:  - Sleep: sleeps poorly, 4-6 hours of sleep on average and 7-8 hours of sleep on average. Insomnia and not a deep sleep  - Hearing: normal hearing bilateral ears.  - Vision:. Reading glasses 1.50  - Dental: regular dental visits, brushes teeth twice daily, brushes teeth three times daily and floss regularly.    /GYN Health:  - Follows with GYN: yes.   - Menopause: perimenopausal.   - History of STDs: no  - Contraception: hysterectomy and male partner had vasectomy.      Advanced Care Planning:  - Has an advanced directive?: yes    - Has a durable medical POA?: yes      Review of Systems  Neg x 12      Objective   /80   Pulse 76   Temp 97.9 °F (36.6 °C)   Resp 16   Ht 5' 1.75\" (1.568 m)   Wt 63 kg (138 lb 12.8 oz)   LMP 10/18/2020   SpO2 98%   BMI 25.59 kg/m²     Physical Exam      Gen.  No acute distress well-appearing well-nourished appears stated age    Mental status  Good judgment and insight oriented to time person and place, recent and remote memory intact mood and affect normal cooperative and patient is reasonable    HEENT  PERRLA 3 mm, EOMI without nystagmus, normocephalic atraumatic without facial weakness    Neck   supple no masses trachea midline positive click normal carotid upstrokes with no bruits    Cor  Regular rhythm without ectopy or murmur, no S3-S4, " normal palpation that is no heave lift or thrill    Vascular  No edema, good pedal pulses    Lungs  CTA bilaterally in no respiratory distress no wheezes rhonchi or rales, normal to palpation no tactile fremitus    Abdomen  Soft, no palpable masses, no hepatosplenomegaly, normal bowel sounds, nontender    Lymphatics  No palpable nodes in the neck, supraclavicular area, axilla, or groin    Musculoskeletal  No clubbing cyanosis or edema muscle tone normal    Skin  no rashes or abnormal appearing lesions    Neuro  Normal ambulation, cranial nerves 2-12 grossly intact, higher functioning with reasoning intact.

## (undated) DEVICE — UTERINE MANIPULATOR RUMI 6.7 X 8 CM

## (undated) DEVICE — KIT INCLUDES:GTB14, ALEXIS CONTAINED EXT SYS 5BXCNGL3, GELPOINT MINI ADV ACCESS PLATFORM: Brand: ALEXIS CONTAINED EXTRACTION SYSTEM WITH GELPOINT MINI ADVANCED ACCESS PLATFORM

## (undated) DEVICE — GLOVE INDICATOR PI UNDERGLOVE SZ 7.5 BLUE

## (undated) DEVICE — DRAPE EQUIPMENT RF WAND

## (undated) DEVICE — STANDARD SURGICAL GOWN, L: Brand: CONVERTORS

## (undated) DEVICE — Device: Brand: OLYMPUS

## (undated) DEVICE — TROCAR: Brand: KII SLEEVE

## (undated) DEVICE — MAYO STAND COVER: Brand: CONVERTORS

## (undated) DEVICE — ADHESIVE SKIN HIGH VISCOSITY EXOFIN 1ML

## (undated) DEVICE — PREMIUM DRY TRAY LF: Brand: MEDLINE INDUSTRIES, INC.

## (undated) DEVICE — INTENDED FOR TISSUE SEPARATION, AND OTHER PROCEDURES THAT REQUIRE A SHARP SURGICAL BLADE TO PUNCTURE OR CUT.: Brand: BARD-PARKER SAFETY BLADES SIZE 11, STERILE

## (undated) DEVICE — GLOVE PI ULTRA TOUCH SZ.7.5

## (undated) DEVICE — BETHLEHEM UNIVERSAL GYN LAP PK: Brand: CARDINAL HEALTH

## (undated) DEVICE — 3000CC GUARDIAN II: Brand: GUARDIAN

## (undated) DEVICE — 5 MM CURVED DISSECTORS WITH MONOPOLAR CAUTERY: Brand: ENDOPATH

## (undated) DEVICE — UNDYED BRAIDED (POLYGLACTIN 910), SYNTHETIC ABSORBABLE SUTURE: Brand: COATED VICRYL

## (undated) DEVICE — GLOVE PI ULTRA TOUCH SZ 6

## (undated) DEVICE — GAUZE SPONGES,16 PLY: Brand: CURITY

## (undated) DEVICE — GLOVE PI ULTRA TOUCH SZ.7.0

## (undated) DEVICE — ELECTRODE LAP J HOOK E-Z CLEAN 33CM-0021

## (undated) DEVICE — PLASTIC ADHESIVE BANDAGE: Brand: CURITY

## (undated) DEVICE — TRAY FOLEY 16FR URIMETER SILICONE SURESTEP

## (undated) DEVICE — LIGASURE LAP SLR/DIV MARYLAND 5MM

## (undated) DEVICE — IRRIG ENDO FLO TUBING

## (undated) DEVICE — SCD SEQUENTIAL COMPRESSION COMFORT SLEEVE MEDIUM KNEE LENGTH: Brand: KENDALL SCD

## (undated) DEVICE — ENDOPATH 5MM CURVED SCISSORS WITH MONOPOLAR CAUTERY: Brand: ENDOPATH

## (undated) DEVICE — BLUE HEAT SCOPE WARMER

## (undated) DEVICE — GLOVE INDICATOR PI UNDERGLOVE SZ 8 BLUE

## (undated) DEVICE — GLOVE INDICATOR PI UNDERGLOVE SZ 6.5 BLUE

## (undated) DEVICE — [HIGH FLOW INSUFFLATOR,  DO NOT USE IF PACKAGE IS DAMAGED,  KEEP DRY,  KEEP AWAY FROM SUNLIGHT,  PROTECT FROM HEAT AND RADIOACTIVE SOURCES.]: Brand: PNEUMOSURE

## (undated) DEVICE — 3M™ TEGADERM™ TRANSPARENT FILM DRESSING FRAME STYLE, 1624W, 2-3/8 IN X 2-3/4 IN (6 CM X 7 CM), 100/CT 4CT/CASE: Brand: 3M™ TEGADERM™

## (undated) DEVICE — LAPAROSCOPIC SMOKE EVAC TUBING

## (undated) DEVICE — GLOVE PI ULTRA TOUCH SZ.6.5

## (undated) DEVICE — TROCAR: Brand: KII FIOS FIRST ENTRY

## (undated) DEVICE — SUT PLAIN 3-0 PS-1 27 IN 1640H

## (undated) DEVICE — CHLORAPREP HI-LITE 26ML ORANGE

## (undated) DEVICE — PENCIL ELECTROSURG E-Z CLEAN -0035H

## (undated) DEVICE — ENDOPATH PNEUMONEEDLE INSUFFLATION NEEDLES WITH LUER LOCK CONNECTORS 120MM: Brand: ENDOPATH

## (undated) DEVICE — SUT SILK 0 CT-1 30 IN 424H

## (undated) DEVICE — DRAPE LAPAROTOMY W/POUCHES